# Patient Record
Sex: MALE | Race: WHITE
[De-identification: names, ages, dates, MRNs, and addresses within clinical notes are randomized per-mention and may not be internally consistent; named-entity substitution may affect disease eponyms.]

---

## 2021-05-24 ENCOUNTER — HOSPITAL ENCOUNTER (INPATIENT)
Dept: HOSPITAL 8 - ED | Age: 66
LOS: 1 days | Discharge: LEFT BEFORE DISCHARGE | DRG: 303 | End: 2021-05-25
Attending: INTERNAL MEDICINE | Admitting: HOSPITALIST
Payer: SELF-PAY

## 2021-05-24 VITALS — HEIGHT: 66 IN | WEIGHT: 215.61 LBS | BODY MASS INDEX: 34.65 KG/M2

## 2021-05-24 VITALS — SYSTOLIC BLOOD PRESSURE: 196 MMHG | DIASTOLIC BLOOD PRESSURE: 68 MMHG

## 2021-05-24 DIAGNOSIS — E78.5: ICD-10-CM

## 2021-05-24 DIAGNOSIS — I25.10: Primary | ICD-10-CM

## 2021-05-24 DIAGNOSIS — Z91.041: ICD-10-CM

## 2021-05-24 DIAGNOSIS — Z53.29: ICD-10-CM

## 2021-05-24 DIAGNOSIS — E66.9: ICD-10-CM

## 2021-05-24 DIAGNOSIS — Z95.5: ICD-10-CM

## 2021-05-24 DIAGNOSIS — I11.9: ICD-10-CM

## 2021-05-24 DIAGNOSIS — Z91.19: ICD-10-CM

## 2021-05-24 DIAGNOSIS — F40.240: ICD-10-CM

## 2021-05-24 DIAGNOSIS — F17.210: ICD-10-CM

## 2021-05-24 DIAGNOSIS — I25.2: ICD-10-CM

## 2021-05-24 LAB
ALBUMIN SERPL-MCNC: 4 G/DL (ref 3.4–5)
ANION GAP SERPL CALC-SCNC: 6 MMOL/L (ref 5–15)
BASOPHILS # BLD AUTO: 0.1 X10^3/UL (ref 0–0.1)
BASOPHILS NFR BLD AUTO: 1 % (ref 0–1)
CALCIUM SERPL-MCNC: 8.7 MG/DL (ref 8.5–10.1)
CHLORIDE SERPL-SCNC: 108 MMOL/L (ref 98–107)
CREAT SERPL-MCNC: 0.71 MG/DL (ref 0.7–1.3)
EOSINOPHIL # BLD AUTO: 0.3 X10^3/UL (ref 0–0.4)
EOSINOPHIL NFR BLD AUTO: 3 % (ref 1–7)
ERYTHROCYTE [DISTWIDTH] IN BLOOD BY AUTOMATED COUNT: 12.5 % (ref 9.4–14.8)
LYMPHOCYTES # BLD AUTO: 1.8 X10^3/UL (ref 1–3.4)
LYMPHOCYTES NFR BLD AUTO: 17 % (ref 22–44)
MCH RBC QN AUTO: 33.6 PG (ref 27.5–34.5)
MCHC RBC AUTO-ENTMCNC: 35.1 G/DL (ref 33.2–36.2)
MD: NO
MONOCYTES # BLD AUTO: 1 X10^3/UL (ref 0.2–0.8)
MONOCYTES NFR BLD AUTO: 9 % (ref 2–9)
NEUTROPHILS # BLD AUTO: 7.6 X10^3/UL (ref 1.8–6.8)
NEUTROPHILS NFR BLD AUTO: 71 % (ref 42–75)
PLATELET # BLD AUTO: 269 X10^3/UL (ref 130–400)
PMV BLD AUTO: 7.7 FL (ref 7.4–10.4)
RBC # BLD AUTO: 4.71 X10^6/UL (ref 4.38–5.82)
TROPONIN I SERPL-MCNC: < 0.015 NG/ML (ref 0–0.04)

## 2021-05-24 PROCEDURE — 96374 THER/PROPH/DIAG INJ IV PUSH: CPT

## 2021-05-24 PROCEDURE — 99285 EMERGENCY DEPT VISIT HI MDM: CPT

## 2021-05-24 PROCEDURE — 84484 ASSAY OF TROPONIN QUANT: CPT

## 2021-05-24 PROCEDURE — 83735 ASSAY OF MAGNESIUM: CPT

## 2021-05-24 PROCEDURE — 93306 TTE W/DOPPLER COMPLETE: CPT

## 2021-05-24 PROCEDURE — 71045 X-RAY EXAM CHEST 1 VIEW: CPT

## 2021-05-24 PROCEDURE — 83036 HEMOGLOBIN GLYCOSYLATED A1C: CPT

## 2021-05-24 PROCEDURE — 80048 BASIC METABOLIC PNL TOTAL CA: CPT

## 2021-05-24 PROCEDURE — 36415 COLL VENOUS BLD VENIPUNCTURE: CPT

## 2021-05-24 PROCEDURE — 84443 ASSAY THYROID STIM HORMONE: CPT

## 2021-05-24 PROCEDURE — 82040 ASSAY OF SERUM ALBUMIN: CPT

## 2021-05-24 PROCEDURE — 84100 ASSAY OF PHOSPHORUS: CPT

## 2021-05-24 PROCEDURE — 83880 ASSAY OF NATRIURETIC PEPTIDE: CPT

## 2021-05-24 PROCEDURE — 85025 COMPLETE CBC W/AUTO DIFF WBC: CPT

## 2021-05-24 PROCEDURE — 80061 LIPID PANEL: CPT

## 2021-05-24 PROCEDURE — 93005 ELECTROCARDIOGRAM TRACING: CPT

## 2021-05-24 PROCEDURE — 93356 MYOCRD STRAIN IMG SPCKL TRCK: CPT

## 2021-05-24 PROCEDURE — 93017 CV STRESS TEST TRACING ONLY: CPT

## 2021-05-24 NOTE — NUR
ASSUMED CARE OF PATIENT. PATIENT REPORTS CENTER CHEST PAIN 8/10 X2 DAYS. HX OF 
MI'S AND SENTS. CARDIAC MONITOR ON NSR NOTED. FRIEND AT BEDSIDE. CALL LIGHT IN 
PLACE.

## 2021-05-25 VITALS — DIASTOLIC BLOOD PRESSURE: 89 MMHG | SYSTOLIC BLOOD PRESSURE: 176 MMHG

## 2021-05-25 VITALS — DIASTOLIC BLOOD PRESSURE: 78 MMHG | SYSTOLIC BLOOD PRESSURE: 133 MMHG

## 2021-05-25 VITALS — DIASTOLIC BLOOD PRESSURE: 64 MMHG | SYSTOLIC BLOOD PRESSURE: 151 MMHG

## 2021-05-25 LAB
ANION GAP SERPL CALC-SCNC: 7 MMOL/L (ref 5–15)
BASOPHILS # BLD AUTO: 0.1 X10^3/UL (ref 0–0.1)
BASOPHILS NFR BLD AUTO: 1 % (ref 0–1)
CALCIUM SERPL-MCNC: 8.8 MG/DL (ref 8.5–10.1)
CHLORIDE SERPL-SCNC: 106 MMOL/L (ref 98–107)
CHOL/HDL RATIO: 6
CREAT SERPL-MCNC: 0.92 MG/DL (ref 0.7–1.3)
EOSINOPHIL # BLD AUTO: 0.4 X10^3/UL (ref 0–0.4)
EOSINOPHIL NFR BLD AUTO: 4 % (ref 1–7)
ERYTHROCYTE [DISTWIDTH] IN BLOOD BY AUTOMATED COUNT: 12.2 % (ref 9.4–14.8)
EST. AVERAGE GLUCOSE BLD GHB EST-MCNC: 134 MG/DL (ref 0–126)
HDL CHOL %: 17 % (ref 26–37)
HDL CHOLESTEROL (DIRECT): 30 MG/DL (ref 40–60)
LDL CHOLESTEROL,CALCULATED: 97 MG/DL (ref 54–169)
LDLC/HDLC SERPL: 3.2 {RATIO} (ref 0.5–3)
LYMPHOCYTES # BLD AUTO: 1.9 X10^3/UL (ref 1–3.4)
LYMPHOCYTES NFR BLD AUTO: 19 % (ref 22–44)
MCH RBC QN AUTO: 33.4 PG (ref 27.5–34.5)
MCHC RBC AUTO-ENTMCNC: 34.6 G/DL (ref 33.2–36.2)
MD: NO
MONOCYTES # BLD AUTO: 0.9 X10^3/UL (ref 0.2–0.8)
MONOCYTES NFR BLD AUTO: 9 % (ref 2–9)
NEUTROPHILS # BLD AUTO: 6.8 X10^3/UL (ref 1.8–6.8)
NEUTROPHILS NFR BLD AUTO: 67 % (ref 42–75)
PLATELET # BLD AUTO: 265 X10^3/UL (ref 130–400)
PMV BLD AUTO: 7.8 FL (ref 7.4–10.4)
RBC # BLD AUTO: 4.65 X10^6/UL (ref 4.38–5.82)
TRIGL SERPL-MCNC: 261 MG/DL (ref 50–200)
TROPONIN I SERPL-MCNC: < 0.015 NG/ML (ref 0–0.04)
TROPONIN I SERPL-MCNC: < 0.015 NG/ML (ref 0–0.04)
VLDLC SERPL CALC-MCNC: 52 MG/DL (ref 0–25)

## 2021-05-25 RX ADMIN — ONDANSETRON PRN MG: 2 INJECTION, SOLUTION INTRAMUSCULAR; INTRAVENOUS at 06:20

## 2021-05-25 RX ADMIN — HEPARIN SODIUM SCH UNITS: 5000 INJECTION, SOLUTION INTRAVENOUS; SUBCUTANEOUS at 00:00

## 2021-05-25 RX ADMIN — MORPHINE SULFATE PRN MG: 10 INJECTION INTRAVENOUS at 06:20

## 2021-05-25 RX ADMIN — HEPARIN SODIUM SCH UNITS: 5000 INJECTION, SOLUTION INTRAVENOUS; SUBCUTANEOUS at 07:35

## 2021-05-25 RX ADMIN — MORPHINE SULFATE PRN MG: 10 INJECTION INTRAVENOUS at 00:18

## 2021-05-25 RX ADMIN — ONDANSETRON PRN MG: 2 INJECTION, SOLUTION INTRAMUSCULAR; INTRAVENOUS at 11:45

## 2021-05-25 RX ADMIN — MORPHINE SULFATE PRN MG: 10 INJECTION INTRAVENOUS at 11:46

## 2023-03-31 ENCOUNTER — APPOINTMENT (OUTPATIENT)
Dept: CT IMAGING | Facility: HOSPITAL | Age: 68
End: 2023-03-31
Attending: EMERGENCY MEDICINE
Payer: MEDICARE

## 2023-03-31 ENCOUNTER — HOSPITAL ENCOUNTER (OUTPATIENT)
Facility: HOSPITAL | Age: 68
Setting detail: OBSERVATION
Discharge: LEFT BEFORE DISCHARGE | End: 2023-04-04
Attending: EMERGENCY MEDICINE | Admitting: HOSPITALIST
Payer: MEDICARE

## 2023-03-31 ENCOUNTER — APPOINTMENT (OUTPATIENT)
Dept: GENERAL RADIOLOGY | Facility: HOSPITAL | Age: 68
End: 2023-03-31
Attending: EMERGENCY MEDICINE
Payer: MEDICARE

## 2023-03-31 DIAGNOSIS — I25.10 CORONARY ARTERY DISEASE INVOLVING NATIVE CORONARY ARTERY OF NATIVE HEART, UNSPECIFIED WHETHER ANGINA PRESENT: ICD-10-CM

## 2023-03-31 DIAGNOSIS — E27.8 ADRENAL NODULE (HCC): ICD-10-CM

## 2023-03-31 DIAGNOSIS — R07.9 ACUTE CHEST PAIN: Primary | ICD-10-CM

## 2023-03-31 LAB
ALBUMIN SERPL-MCNC: 4.2 G/DL (ref 3.4–5)
ALBUMIN/GLOB SERPL: 1.1 {RATIO} (ref 1–2)
ALP LIVER SERPL-CCNC: 94 U/L
ALT SERPL-CCNC: 34 U/L
ANION GAP SERPL CALC-SCNC: 7 MMOL/L (ref 0–18)
AST SERPL-CCNC: 20 U/L (ref 15–37)
BASOPHILS # BLD AUTO: 0.04 X10(3) UL (ref 0–0.2)
BASOPHILS NFR BLD AUTO: 0.4 %
BILIRUB SERPL-MCNC: 0.8 MG/DL (ref 0.1–2)
BILIRUB UR QL STRIP.AUTO: NEGATIVE
BUN BLD-MCNC: 22 MG/DL (ref 7–18)
CALCIUM BLD-MCNC: 9.7 MG/DL (ref 8.5–10.1)
CHLORIDE SERPL-SCNC: 108 MMOL/L (ref 98–112)
CLARITY UR REFRACT.AUTO: CLEAR
CO2 SERPL-SCNC: 23 MMOL/L (ref 21–32)
CREAT BLD-MCNC: 1.04 MG/DL
D DIMER PPP FEU-MCNC: <0.27 UG/ML FEU (ref ?–0.66)
EOSINOPHIL # BLD AUTO: 0.1 X10(3) UL (ref 0–0.7)
EOSINOPHIL NFR BLD AUTO: 0.9 %
ERYTHROCYTE [DISTWIDTH] IN BLOOD BY AUTOMATED COUNT: 12.8 %
EST. AVERAGE GLUCOSE BLD GHB EST-MCNC: 108 MG/DL (ref 68–126)
GFR SERPLBLD BASED ON 1.73 SQ M-ARVRAT: 79 ML/MIN/1.73M2 (ref 60–?)
GLOBULIN PLAS-MCNC: 3.7 G/DL (ref 2.8–4.4)
GLUCOSE BLD-MCNC: 106 MG/DL (ref 70–99)
GLUCOSE BLD-MCNC: 194 MG/DL (ref 70–99)
GLUCOSE UR STRIP.AUTO-MCNC: NEGATIVE MG/DL
HBA1C MFR BLD: 5.4 % (ref ?–5.7)
HCT VFR BLD AUTO: 38.9 %
HGB BLD-MCNC: 12.6 G/DL
IMM GRANULOCYTES # BLD AUTO: 0.08 X10(3) UL (ref 0–1)
IMM GRANULOCYTES NFR BLD: 0.8 %
LEUKOCYTE ESTERASE UR QL STRIP.AUTO: NEGATIVE
LYMPHOCYTES # BLD AUTO: 0.74 X10(3) UL (ref 1–4)
LYMPHOCYTES NFR BLD AUTO: 7 %
MCH RBC QN AUTO: 31.3 PG (ref 26–34)
MCHC RBC AUTO-ENTMCNC: 32.4 G/DL (ref 31–37)
MCV RBC AUTO: 96.8 FL
MONOCYTES # BLD AUTO: 0.69 X10(3) UL (ref 0.1–1)
MONOCYTES NFR BLD AUTO: 6.5 %
NEUTROPHILS # BLD AUTO: 8.9 X10 (3) UL (ref 1.5–7.7)
NEUTROPHILS # BLD AUTO: 8.9 X10(3) UL (ref 1.5–7.7)
NEUTROPHILS NFR BLD AUTO: 84.4 %
NITRITE UR QL STRIP.AUTO: NEGATIVE
OSMOLALITY SERPL CALC.SUM OF ELEC: 290 MOSM/KG (ref 275–295)
PH UR STRIP.AUTO: 7 [PH] (ref 5–8)
PLATELET # BLD AUTO: 247 10(3)UL (ref 150–450)
POTASSIUM SERPL-SCNC: 4.2 MMOL/L (ref 3.5–5.1)
PROT SERPL-MCNC: 7.9 G/DL (ref 6.4–8.2)
PROT UR STRIP.AUTO-MCNC: NEGATIVE MG/DL
RBC # BLD AUTO: 4.02 X10(6)UL
RBC UR QL AUTO: NEGATIVE
SARS-COV-2 RNA RESP QL NAA+PROBE: NOT DETECTED
SODIUM SERPL-SCNC: 138 MMOL/L (ref 136–145)
SP GR UR STRIP.AUTO: >1.03 (ref 1–1.03)
TROPONIN I HIGH SENSITIVITY: 23 NG/L
TROPONIN I HIGH SENSITIVITY: 50 NG/L
TROPONIN I HIGH SENSITIVITY: 53 NG/L
UROBILINOGEN UR STRIP.AUTO-MCNC: <2 MG/DL
WBC # BLD AUTO: 10.6 X10(3) UL (ref 4–11)

## 2023-03-31 PROCEDURE — 71045 X-RAY EXAM CHEST 1 VIEW: CPT | Performed by: EMERGENCY MEDICINE

## 2023-03-31 PROCEDURE — 99223 1ST HOSP IP/OBS HIGH 75: CPT | Performed by: HOSPITALIST

## 2023-03-31 PROCEDURE — 71275 CT ANGIOGRAPHY CHEST: CPT | Performed by: EMERGENCY MEDICINE

## 2023-03-31 RX ORDER — EMPAGLIFLOZIN AND METFORMIN HYDROCHLORIDE 12.5; 1 MG/1; MG/1
1 TABLET ORAL 2 TIMES DAILY
COMMUNITY

## 2023-03-31 RX ORDER — ATORVASTATIN CALCIUM 40 MG/1
40 TABLET, FILM COATED ORAL NIGHTLY
COMMUNITY

## 2023-03-31 RX ORDER — ASPIRIN 325 MG
325 TABLET ORAL DAILY
COMMUNITY
End: 2023-04-04

## 2023-03-31 RX ORDER — NICOTINE POLACRILEX 4 MG
15 LOZENGE BUCCAL
Status: DISCONTINUED | OUTPATIENT
Start: 2023-03-31 | End: 2023-04-04

## 2023-03-31 RX ORDER — BISACODYL 10 MG
10 SUPPOSITORY, RECTAL RECTAL
Status: DISCONTINUED | OUTPATIENT
Start: 2023-03-31 | End: 2023-04-04

## 2023-03-31 RX ORDER — ECHINACEA PURPUREA EXTRACT 125 MG
1 TABLET ORAL
Status: DISCONTINUED | OUTPATIENT
Start: 2023-03-31 | End: 2023-04-04

## 2023-03-31 RX ORDER — MELATONIN
3 NIGHTLY PRN
Status: DISCONTINUED | OUTPATIENT
Start: 2023-03-31 | End: 2023-04-04

## 2023-03-31 RX ORDER — POLYETHYLENE GLYCOL 3350 17 G/17G
17 POWDER, FOR SOLUTION ORAL DAILY PRN
Status: DISCONTINUED | OUTPATIENT
Start: 2023-03-31 | End: 2023-04-04

## 2023-03-31 RX ORDER — SENNOSIDES 8.6 MG
17.2 TABLET ORAL NIGHTLY PRN
Status: DISCONTINUED | OUTPATIENT
Start: 2023-03-31 | End: 2023-04-04

## 2023-03-31 RX ORDER — ACETAMINOPHEN 500 MG
1000 TABLET ORAL EVERY 4 HOURS PRN
Status: DISCONTINUED | OUTPATIENT
Start: 2023-03-31 | End: 2023-04-04

## 2023-03-31 RX ORDER — OMEPRAZOLE 40 MG/1
40 CAPSULE, DELAYED RELEASE ORAL DAILY
COMMUNITY

## 2023-03-31 RX ORDER — LISINOPRIL 20 MG/1
20 TABLET ORAL DAILY
Status: DISCONTINUED | OUTPATIENT
Start: 2023-04-01 | End: 2023-04-04

## 2023-03-31 RX ORDER — ATORVASTATIN CALCIUM 40 MG/1
40 TABLET, FILM COATED ORAL NIGHTLY
Status: DISCONTINUED | OUTPATIENT
Start: 2023-04-01 | End: 2023-04-04

## 2023-03-31 RX ORDER — CLOPIDOGREL BISULFATE 75 MG/1
75 TABLET ORAL DAILY
Status: DISCONTINUED | OUTPATIENT
Start: 2023-04-01 | End: 2023-04-04

## 2023-03-31 RX ORDER — LISINOPRIL 20 MG/1
20 TABLET ORAL DAILY
COMMUNITY

## 2023-03-31 RX ORDER — NICOTINE POLACRILEX 4 MG
30 LOZENGE BUCCAL
Status: DISCONTINUED | OUTPATIENT
Start: 2023-03-31 | End: 2023-04-04

## 2023-03-31 RX ORDER — CLOPIDOGREL BISULFATE 75 MG/1
75 TABLET ORAL DAILY
COMMUNITY

## 2023-03-31 RX ORDER — DEXTROSE MONOHYDRATE 25 G/50ML
50 INJECTION, SOLUTION INTRAVENOUS
Status: DISCONTINUED | OUTPATIENT
Start: 2023-03-31 | End: 2023-04-04

## 2023-03-31 RX ORDER — ONDANSETRON 2 MG/ML
4 INJECTION INTRAMUSCULAR; INTRAVENOUS EVERY 6 HOURS PRN
Status: DISCONTINUED | OUTPATIENT
Start: 2023-03-31 | End: 2023-04-04

## 2023-03-31 RX ORDER — NITROGLYCERIN 0.4 MG/1
0.4 TABLET SUBLINGUAL EVERY 5 MIN PRN
COMMUNITY

## 2023-03-31 RX ORDER — SODIUM PHOSPHATE, DIBASIC AND SODIUM PHOSPHATE, MONOBASIC 7; 19 G/133ML; G/133ML
1 ENEMA RECTAL ONCE AS NEEDED
Status: DISCONTINUED | OUTPATIENT
Start: 2023-03-31 | End: 2023-04-04

## 2023-03-31 RX ORDER — HYDROXYZINE HYDROCHLORIDE 25 MG/1
25 TABLET, FILM COATED ORAL 3 TIMES DAILY PRN
COMMUNITY

## 2023-03-31 RX ORDER — PANTOPRAZOLE SODIUM 40 MG/1
40 TABLET, DELAYED RELEASE ORAL
Status: DISCONTINUED | OUTPATIENT
Start: 2023-04-01 | End: 2023-04-04

## 2023-03-31 RX ORDER — NITROGLYCERIN 0.4 MG/1
0.4 TABLET SUBLINGUAL EVERY 5 MIN PRN
Status: DISCONTINUED | OUTPATIENT
Start: 2023-03-31 | End: 2023-04-04

## 2023-03-31 RX ORDER — LORAZEPAM 2 MG/ML
0.5 INJECTION INTRAMUSCULAR ONCE
Status: COMPLETED | OUTPATIENT
Start: 2023-03-31 | End: 2023-03-31

## 2023-03-31 RX ORDER — METOPROLOL SUCCINATE 25 MG/1
25 TABLET, EXTENDED RELEASE ORAL
Status: DISCONTINUED | OUTPATIENT
Start: 2023-04-01 | End: 2023-04-04

## 2023-03-31 RX ORDER — ONDANSETRON 2 MG/ML
INJECTION INTRAMUSCULAR; INTRAVENOUS
Status: COMPLETED
Start: 2023-03-31 | End: 2023-03-31

## 2023-03-31 RX ORDER — METOCLOPRAMIDE HYDROCHLORIDE 5 MG/ML
10 INJECTION INTRAMUSCULAR; INTRAVENOUS EVERY 8 HOURS PRN
Status: DISCONTINUED | OUTPATIENT
Start: 2023-03-31 | End: 2023-04-04

## 2023-03-31 RX ORDER — METOPROLOL SUCCINATE 25 MG/1
25 TABLET, EXTENDED RELEASE ORAL DAILY
COMMUNITY

## 2023-03-31 RX ORDER — DULAGLUTIDE 0.75 MG/.5ML
INJECTION, SOLUTION SUBCUTANEOUS
COMMUNITY

## 2023-03-31 RX ORDER — FUROSEMIDE 20 MG/1
20 TABLET ORAL 2 TIMES DAILY
COMMUNITY

## 2023-03-31 RX ORDER — NITROGLYCERIN 0.4 MG/1
0.4 TABLET SUBLINGUAL EVERY 5 MIN PRN
Status: DISCONTINUED | OUTPATIENT
Start: 2023-03-31 | End: 2023-03-31

## 2023-03-31 RX ORDER — ASPIRIN 81 MG/1
81 TABLET ORAL DAILY
Status: DISCONTINUED | OUTPATIENT
Start: 2023-03-31 | End: 2023-04-04

## 2023-03-31 RX ORDER — ONDANSETRON 2 MG/ML
4 INJECTION INTRAMUSCULAR; INTRAVENOUS ONCE
Status: COMPLETED | OUTPATIENT
Start: 2023-03-31 | End: 2023-03-31

## 2023-04-01 LAB
BASOPHILS # BLD AUTO: 0.04 X10(3) UL (ref 0–0.2)
BASOPHILS NFR BLD AUTO: 0.6 %
EOSINOPHIL # BLD AUTO: 0.29 X10(3) UL (ref 0–0.7)
EOSINOPHIL NFR BLD AUTO: 4.2 %
ERYTHROCYTE [DISTWIDTH] IN BLOOD BY AUTOMATED COUNT: 12.7 %
GLUCOSE BLD-MCNC: 108 MG/DL (ref 70–99)
GLUCOSE BLD-MCNC: 114 MG/DL (ref 70–99)
GLUCOSE BLD-MCNC: 137 MG/DL (ref 70–99)
GLUCOSE BLD-MCNC: 146 MG/DL (ref 70–99)
GLUCOSE BLD-MCNC: 166 MG/DL (ref 70–99)
HCT VFR BLD AUTO: 35.7 %
HGB BLD-MCNC: 11.4 G/DL
IMM GRANULOCYTES # BLD AUTO: 0.03 X10(3) UL (ref 0–1)
IMM GRANULOCYTES NFR BLD: 0.4 %
LYMPHOCYTES # BLD AUTO: 1.33 X10(3) UL (ref 1–4)
LYMPHOCYTES NFR BLD AUTO: 19.3 %
MCH RBC QN AUTO: 31 PG (ref 26–34)
MCHC RBC AUTO-ENTMCNC: 31.9 G/DL (ref 31–37)
MCV RBC AUTO: 97 FL
MONOCYTES # BLD AUTO: 0.76 X10(3) UL (ref 0.1–1)
MONOCYTES NFR BLD AUTO: 11 %
NEUTROPHILS # BLD AUTO: 4.43 X10 (3) UL (ref 1.5–7.7)
NEUTROPHILS # BLD AUTO: 4.43 X10(3) UL (ref 1.5–7.7)
NEUTROPHILS NFR BLD AUTO: 64.5 %
PLATELET # BLD AUTO: 241 10(3)UL (ref 150–450)
RBC # BLD AUTO: 3.68 X10(6)UL
TROPONIN I HIGH SENSITIVITY: 32 NG/L
WBC # BLD AUTO: 6.9 X10(3) UL (ref 4–11)

## 2023-04-01 PROCEDURE — 99233 SBSQ HOSP IP/OBS HIGH 50: CPT | Performed by: HOSPITALIST

## 2023-04-01 NOTE — PLAN OF CARE
A/o x4. RA. VSS. Up ad dl. Continent and voiding. NSR on tele. Bed in lowest position, call light in reach, updated on plan of care, all questions answered at this time.     Problem: Diabetes/Glucose Control  Goal: Glucose maintained within prescribed range  Description: INTERVENTIONS:  - Monitor Blood Glucose as ordered  - Assess for signs and symptoms of hyperglycemia and hypoglycemia  - Administer ordered medications to maintain glucose within target range  - Assess barriers to adequate nutritional intake and initiate nutrition consult as needed  - Instruct patient on self management of diabetes  Outcome: Progressing

## 2023-04-01 NOTE — CM/SW NOTE
04/01/23 1500   CM/SW Referral Data   Referral Source Social Work (self-referral)   Reason for Referral Discharge planning;Psychosocial assessment   Informant Patient   Medical Hx   Does patient have an established PCP? No   Patient Info   Patient's Current Mental Status at Time of Assessment Alert;Oriented   Patient's [address]   Patient lives with Spouse/Significant other   Patient Status Prior to Admission   Independent with ADLs and Mobility Yes         Received order for 'discharge planning.' Performed chart review prior to meeting with pt. Upon review, noted that pt has had multiple admissions from different hospitals across the country. Pt was just in [facility]. Pt states that he was going home from a work trip to his hometown [place]. Pt states that he was working in [place]. Met with pt at bedside. Pt presents as alert and oriented x4. Pt had his laptop, ipad, cell phone on in his room. Pt looked like he was working. Gathered history from pt. Pt confirms that he lives at home w/ his spouse, who spouse claims is a doctor. Pt states that he has 9 children, 6 daughters are nurses, 2 are police officers, and 1 is a . Pt states that he has a high stress job with [employer]. Pt states that he was taking [employer]/[place] home where the conductor on the train stopped in [facility] area after noticing that the pt did not look well. Pt is now at [facility]. SW questioned means of transport on returning back home at hospital discharge. Pt reports \"i'm not broke, I have this wallet filled with credit cards, and 4 debit cards. \" Pt also showed SW on his phone the [employer] schedule. Pt also discussed his frustration with [facility], states that he gets questioned about his whereabouts when he ends up at other hospitals. Pt got a second opinion from [facility] here, planning for a cath on Monday.  Pt heavily explained how the cardiologist at [place] [address] knows his cardiology group back home on the [facility]. Pt also stated that his spouse is driving to a [facility] today for about 200 miles so that she can fax paperwork from his hospital stay to the cardiologist to review. Upon leaving pt's room, pt states \"that he is not here for a free turkey sandwich if that's what the hospital thinks he's here for. \"    SW will continue to follow. Noted that psych has been consulted.     PROSPER Perez, [facility]  Discharge [address] [facility].

## 2023-04-01 NOTE — PROGRESS NOTES
NURSING ADMISSION NOTE      Patient admitted via Cart  Oriented to room. Safety precautions initiated. Bed in low position. Call light in reach. Admission navigator completed with patient. Alert and oriented x4. On room air with adequate O2 saturations. NSR/SB on tele. C/o chest pain and headache. Offered extra strength tylenol for headache and nitroglycerin for chest pain. Declining, stating \"don't worry about it, I am not going to beg for anything. \" Continent. Up ad dl with a steady gait. Needs met at this time. APN and MD aware of request for stronger pain medications. 0150: pt requested to see this writer. Well appearing, non-diaphoretic, awake on phone, stating \"I am still having this chest pain,\" this RN offered nitroglycerin and the extra strength tylenol as ordered, pt stating \"do you know how much nitroglycerin I have had tonight? They don't have anything else for me? \" Educated pt that per cardiology, they are not ordering any other pain medications, he then stated Leslie Lilly are you guys being so mean to me? Will cardiology be seeing me in the morning? \" Also wondering who the group to be seeing him was. Answered patient's questions.        Plan:  EKG  Trend troponins  Nitroglycerin and tylenol for pain

## 2023-04-02 LAB
ATRIAL RATE: 85 BPM
GLUCOSE BLD-MCNC: 120 MG/DL (ref 70–99)
GLUCOSE BLD-MCNC: 138 MG/DL (ref 70–99)
GLUCOSE BLD-MCNC: 148 MG/DL (ref 70–99)
GLUCOSE BLD-MCNC: 171 MG/DL (ref 70–99)
P AXIS: 49 DEGREES
P-R INTERVAL: 142 MS
Q-T INTERVAL: 364 MS
QRS DURATION: 94 MS
QTC CALCULATION (BEZET): 433 MS
R AXIS: -34 DEGREES
T AXIS: 35 DEGREES
VENTRICULAR RATE: 85 BPM

## 2023-04-02 PROCEDURE — 99233 SBSQ HOSP IP/OBS HIGH 50: CPT | Performed by: HOSPITALIST

## 2023-04-02 RX ORDER — MORPHINE SULFATE 4 MG/ML
4 INJECTION, SOLUTION INTRAMUSCULAR; INTRAVENOUS EVERY 2 HOUR PRN
Status: DISCONTINUED | OUTPATIENT
Start: 2023-04-02 | End: 2023-04-03

## 2023-04-02 RX ORDER — SODIUM CHLORIDE 9 MG/ML
INJECTION, SOLUTION INTRAVENOUS CONTINUOUS
Status: DISCONTINUED | OUTPATIENT
Start: 2023-04-02 | End: 2023-04-04

## 2023-04-02 RX ORDER — ASPIRIN 81 MG/1
324 TABLET, CHEWABLE ORAL ONCE
Status: COMPLETED | OUTPATIENT
Start: 2023-04-02 | End: 2023-04-03

## 2023-04-02 RX ORDER — MORPHINE SULFATE 2 MG/ML
1 INJECTION, SOLUTION INTRAMUSCULAR; INTRAVENOUS EVERY 2 HOUR PRN
Status: DISCONTINUED | OUTPATIENT
Start: 2023-04-02 | End: 2023-04-03

## 2023-04-02 RX ORDER — MORPHINE SULFATE 2 MG/ML
2 INJECTION, SOLUTION INTRAMUSCULAR; INTRAVENOUS EVERY 2 HOUR PRN
Status: DISCONTINUED | OUTPATIENT
Start: 2023-04-02 | End: 2023-04-03

## 2023-04-02 NOTE — PROGRESS NOTES
1200: Seemingly out of the blue, Pt stating he will \"chino the hospital\" if he does not get a new hospitalist. Also states, \"I will go to the supervisor if she comes in my room again\". Pt will not specify what has upset him about current MD. This Manolo Choudhary paged hospitalist to make aware, also informed charge RN.

## 2023-04-02 NOTE — PLAN OF CARE
Pt a/o x4. RA. VSS. Up ad dl. NSR on tele. Continent and voiding. No complaints of pain. Free of cardiac symptoms all day. Consent for angiogram placed in chart. Updated on plan of care, bed in lowest position, all questions answered.   Problem: Diabetes/Glucose Control  Goal: Glucose maintained within prescribed range  Description: INTERVENTIONS:  - Monitor Blood Glucose as ordered  - Assess for signs and symptoms of hyperglycemia and hypoglycemia  - Administer ordered medications to maintain glucose within target range  - Assess barriers to adequate nutritional intake and initiate nutrition consult as needed  - Instruct patient on self management of diabetes  Outcome: Progressing     Problem: Patient/Family Goals  Goal: Patient/Family Long Term Goal  Description: Patient's Long Term Goal: \" to go home\"    Interventions:  - cath monday  - See additional Care Plan goals for specific interventions  Outcome: Progressing  Goal: Patient/Family Short Term Goal  Description: Patient's Short Term Goal: feel better    Interventions:   - rest  - cath monday  - See additional Care Plan goals for specific interventions  Outcome: Progressing

## 2023-04-02 NOTE — PLAN OF CARE
Assumed care at [address] [facility]. Pt is A&Ox4. Pt is on RA, sats maintaining >90%. NSR on tele, VSS. No complaints of cardiac symptoms. Continent of B&B. Denies pain at this time. Up independently, tolerating well. Plan of care reviewed with patient, verbalizes understanding, all needs addressed at this time, pt seems to be resting comfortably.      Problem: Diabetes/Glucose Control  Goal: Glucose maintained within prescribed range  Description: INTERVENTIONS:  - Monitor Blood Glucose as ordered  - Assess for signs and symptoms of hyperglycemia and hypoglycemia  - Administer ordered medications to maintain glucose within target range  - Assess barriers to adequate nutritional intake and initiate nutrition consult as needed  - Instruct patient on self management of diabetes  4/1/2023 2129 by Migel Samson RN  Outcome: Progressing  4/1/2023 2128 by Migel Samson RN  Outcome: Progressing     Problem: Patient/Family Goals  Goal: Patient/Family Long Term Goal  Description: Patient's Long Term Goal: \" to go home\"    Interventions:  - cath monday  - See additional Care Plan goals for specific interventions  4/1/2023 2129 by Migel Samson RN  Outcome: Progressing  4/1/2023 2128 by Migel Samson RN  Outcome: Progressing  Goal: Patient/Family Short Term Goal  Description: Patient's Short Term Goal: feel better    Interventions:   - rest  - cath monday  - See additional Care Plan goals for specific interventions  4/1/2023 2129 by Migel Samson RN  Outcome: Progressing  4/1/2023 2128 by Migel Samson RN  Outcome: Progressing

## 2023-04-03 LAB
ANION GAP SERPL CALC-SCNC: 3 MMOL/L (ref 0–18)
APTT PPP: 30.7 SECONDS (ref 23.3–35.6)
ATRIAL RATE: 56 BPM
BUN BLD-MCNC: 26 MG/DL (ref 7–18)
CALCIUM BLD-MCNC: 8.9 MG/DL (ref 8.5–10.1)
CHLORIDE SERPL-SCNC: 111 MMOL/L (ref 98–112)
CO2 SERPL-SCNC: 22 MMOL/L (ref 21–32)
CREAT BLD-MCNC: 0.95 MG/DL
ERYTHROCYTE [DISTWIDTH] IN BLOOD BY AUTOMATED COUNT: 12.4 %
GFR SERPLBLD BASED ON 1.73 SQ M-ARVRAT: 88 ML/MIN/1.73M2 (ref 60–?)
GLUCOSE BLD-MCNC: 118 MG/DL (ref 70–99)
GLUCOSE BLD-MCNC: 137 MG/DL (ref 70–99)
GLUCOSE BLD-MCNC: 137 MG/DL (ref 70–99)
GLUCOSE BLD-MCNC: 138 MG/DL (ref 70–99)
GLUCOSE BLD-MCNC: 186 MG/DL (ref 70–99)
GLUCOSE BLD-MCNC: 226 MG/DL (ref 70–99)
HCT VFR BLD AUTO: 38.2 %
HGB BLD-MCNC: 12.4 G/DL
INR BLD: 1.1 (ref 0.85–1.16)
MAGNESIUM SERPL-MCNC: 2.3 MG/DL (ref 1.6–2.6)
MCH RBC QN AUTO: 31.5 PG (ref 26–34)
MCHC RBC AUTO-ENTMCNC: 32.5 G/DL (ref 31–37)
MCV RBC AUTO: 97 FL
OSMOLALITY SERPL CALC.SUM OF ELEC: 289 MOSM/KG (ref 275–295)
P AXIS: 57 DEGREES
P-R INTERVAL: 156 MS
PLATELET # BLD AUTO: 252 10(3)UL (ref 150–450)
POTASSIUM SERPL-SCNC: 4.2 MMOL/L (ref 3.5–5.1)
PROTHROMBIN TIME: 14.2 SECONDS (ref 11.6–14.8)
Q-T INTERVAL: 438 MS
QRS DURATION: 96 MS
QTC CALCULATION (BEZET): 422 MS
R AXIS: -23 DEGREES
RBC # BLD AUTO: 3.94 X10(6)UL
SODIUM SERPL-SCNC: 136 MMOL/L (ref 136–145)
T AXIS: 14 DEGREES
VENTRICULAR RATE: 56 BPM
WBC # BLD AUTO: 7.6 X10(3) UL (ref 4–11)

## 2023-04-03 PROCEDURE — 99232 SBSQ HOSP IP/OBS MODERATE 35: CPT | Performed by: HOSPITALIST

## 2023-04-03 RX ORDER — KETOROLAC TROMETHAMINE 30 MG/ML
30 INJECTION, SOLUTION INTRAMUSCULAR; INTRAVENOUS EVERY 6 HOURS PRN
Status: DISCONTINUED | OUTPATIENT
Start: 2023-04-03 | End: 2023-04-04

## 2023-04-03 RX ORDER — KETOROLAC TROMETHAMINE 30 MG/ML
15 INJECTION, SOLUTION INTRAMUSCULAR; INTRAVENOUS EVERY 6 HOURS PRN
Status: DISCONTINUED | OUTPATIENT
Start: 2023-04-03 | End: 2023-04-04

## 2023-04-03 NOTE — PLAN OF CARE
Pt  received at bedside. A&O X4. NSR on tele. Continent bowel & bladder, last BM 4/2, Up ad dl. No complaints of pain, SOB or CP. Plan of care discussed w/t patient. Call light with in reach. Fall precaution in place. All questions answered     Pt expected to go to cath lab this morning between [id number]. Declines to go to cath lab until wife comes around 1200. Cath lab notified.

## 2023-04-03 NOTE — PLAN OF CARE
Pt c/o chest pain 6/10- vital signs stable- nitro /tylenol offered to pt but he refused- stated that med has not been working for him-don't know why the Dr have not been giving him something else for pain- Dr Bryan Castaneda notified and order for morphine received- however on further review of pt's chart there has been concerns of him receiving narcotics- MD notified - morphine d/thu -order for toradol received and given  Will con't to monitor  plan for LHC in am  Pt stable  Problem: Diabetes/Glucose Control  Goal: Glucose maintained within prescribed range  Description: INTERVENTIONS:  - Monitor Blood Glucose as ordered  - Assess for signs and symptoms of hyperglycemia and hypoglycemia  - Administer ordered medications to maintain glucose within target range  - Assess barriers to adequate nutritional intake and initiate nutrition consult as needed  - Instruct patient on self management of diabetes  Outcome: Progressing     Problem: CARDIOVASCULAR - ADULT  Goal: Maintains optimal cardiac output and hemodynamic stability  Description: INTERVENTIONS:  - Monitor vital signs, rhythm, and trends  - Monitor for bleeding, hypotension and signs of decreased cardiac output  - Evaluate effectiveness of vasoactive medications to optimize hemodynamic stability  - Monitor arterial and/or venous puncture sites for bleeding and/or hematoma  - Assess quality of pulses, skin color and temperature  - Assess for signs of decreased coronary artery perfusion - ex.  Angina  - Evaluate fluid balance, assess for edema, trend weights  Outcome: Progressing  Goal: Absence of cardiac arrhythmias or at baseline  Description: INTERVENTIONS:  - Continuous cardiac monitoring, monitor vital signs, obtain 12 lead EKG if indicated  - Evaluate effectiveness of antiarrhythmic and heart rate control medications as ordered  - Initiate emergency measures for life threatening arrhythmias  - Monitor electrolytes and administer replacement therapy as ordered  Outcome: Progressing     Problem: METABOLIC/FLUID AND ELECTROLYTES - ADULT  Goal: Glucose maintained within prescribed range  Description: INTERVENTIONS:  - Monitor Blood Glucose as ordered  - Assess for signs and symptoms of hyperglycemia and hypoglycemia  - Administer ordered medications to maintain glucose within target range  - Assess barriers to adequate nutritional intake and initiate nutrition consult as needed  - Instruct patient on self management of diabetes  Outcome: Progressing

## 2023-04-04 ENCOUNTER — APPOINTMENT (OUTPATIENT)
Dept: INTERVENTIONAL RADIOLOGY/VASCULAR | Facility: HOSPITAL | Age: 68
End: 2023-04-04
Attending: PHYSICIAN ASSISTANT
Payer: MEDICARE

## 2023-04-04 VITALS
HEART RATE: 57 BPM | RESPIRATION RATE: 18 BRPM | HEIGHT: 66 IN | DIASTOLIC BLOOD PRESSURE: 67 MMHG | WEIGHT: 187.81 LBS | BODY MASS INDEX: 30.18 KG/M2 | SYSTOLIC BLOOD PRESSURE: 128 MMHG | OXYGEN SATURATION: 95 % | TEMPERATURE: 98 F

## 2023-04-04 RX ORDER — LIDOCAINE HYDROCHLORIDE 10 MG/ML
INJECTION, SOLUTION EPIDURAL; INFILTRATION; INTRACAUDAL; PERINEURAL
Status: COMPLETED
Start: 2023-04-04 | End: 2023-04-04

## 2023-04-04 RX ORDER — HEPARIN SODIUM 5000 [USP'U]/ML
INJECTION, SOLUTION INTRAVENOUS; SUBCUTANEOUS
Status: COMPLETED
Start: 2023-04-04 | End: 2023-04-04

## 2023-04-04 RX ORDER — NITROGLYCERIN 20 MG/100ML
INJECTION INTRAVENOUS
Status: COMPLETED
Start: 2023-04-04 | End: 2023-04-04

## 2023-04-04 RX ORDER — VERAPAMIL HYDROCHLORIDE 2.5 MG/ML
INJECTION, SOLUTION INTRAVENOUS
Status: COMPLETED
Start: 2023-04-04 | End: 2023-04-04

## 2023-04-04 NOTE — PROGRESS NOTES
Received pt at the bedside during handoff report and pt stated that he would like to leave against medical advice. He stated \" Take off my IV and box, I want to go. \" Pt signed the AMA form. Attending and duly cardiology NP made aware. Pt left unit with all belongings, assisted by PCT.

## 2023-04-04 NOTE — PLAN OF CARE
Received patient, alert and oriented. Denied chest pain, denied SOB. Up ad dl. Discussed POC. Due meds given. Reminded NPO P MN. Safety measures reinforced, call light within reach. Needs attended to. Will continue to monitor. Problem: Diabetes/Glucose Control  Goal: Glucose maintained within prescribed range  Description: INTERVENTIONS:  - Monitor Blood Glucose as ordered  - Assess for signs and symptoms of hyperglycemia and hypoglycemia  - Administer ordered medications to maintain glucose within target range  - Assess barriers to adequate nutritional intake and initiate nutrition consult as needed  - Instruct patient on self management of diabetes  Outcome: Progressing     Problem: Patient/Family Goals  Goal: Patient/Family Long Term Goal  Description: Patient's Long Term Goal: \" to go home\"    Interventions:  - cath monday  - See additional Care Plan goals for specific interventions  Outcome: Progressing  Goal: Patient/Family Short Term Goal  Description: Patient's Short Term Goal: feel better    Interventions:   - rest  - cath monday  - See additional Care Plan goals for specific interventions  Outcome: Progressing     Problem: CARDIOVASCULAR - ADULT  Goal: Maintains optimal cardiac output and hemodynamic stability  Description: INTERVENTIONS:  - Monitor vital signs, rhythm, and trends  - Monitor for bleeding, hypotension and signs of decreased cardiac output  - Evaluate effectiveness of vasoactive medications to optimize hemodynamic stability  - Monitor arterial and/or venous puncture sites for bleeding and/or hematoma  - Assess quality of pulses, skin color and temperature  - Assess for signs of decreased coronary artery perfusion - ex.  Angina  - Evaluate fluid balance, assess for edema, trend weights  Outcome: Progressing  Goal: Absence of cardiac arrhythmias or at baseline  Description: INTERVENTIONS:  - Continuous cardiac monitoring, monitor vital signs, obtain 12 lead EKG if indicated  - Evaluate effectiveness of antiarrhythmic and heart rate control medications as ordered  - Initiate emergency measures for life threatening arrhythmias  - Monitor electrolytes and administer replacement therapy as ordered  Outcome: Progressing     Problem: METABOLIC/FLUID AND ELECTROLYTES - ADULT  Goal: Glucose maintained within prescribed range  Description: INTERVENTIONS:  - Monitor Blood Glucose as ordered  - Assess for signs and symptoms of hyperglycemia and hypoglycemia  - Administer ordered medications to maintain glucose within target range  - Assess barriers to adequate nutritional intake and initiate nutrition consult as needed  - Instruct patient on self management of diabetes  Outcome: Progressing

## 2023-04-04 NOTE — PLAN OF CARE
This RN brought AMA papers at patients request. Pt educated on importance of staying in hospital and receiving angiogram. Pt reports that \"wife is having a hard time, Evelio Brooks has my tools, and I'm so far away from home\". Pt signed AMA papers, tele and IV removed, and assisted to [facility] entrance by PCT.

## 2024-06-18 ENCOUNTER — HOSPITAL ENCOUNTER (INPATIENT)
Facility: MEDICAL CENTER | Age: 69
LOS: 4 days | DRG: 392 | End: 2024-06-24
Attending: STUDENT IN AN ORGANIZED HEALTH CARE EDUCATION/TRAINING PROGRAM | Admitting: INTERNAL MEDICINE
Payer: MEDICARE

## 2024-06-18 DIAGNOSIS — R07.9 CHEST PAIN, UNSPECIFIED TYPE: ICD-10-CM

## 2024-06-18 DIAGNOSIS — Z86.79 HISTORY OF CORONARY ARTERY DISEASE: ICD-10-CM

## 2024-06-18 DIAGNOSIS — I10 PRIMARY HYPERTENSION: ICD-10-CM

## 2024-06-18 DIAGNOSIS — F17.200 SMOKING: ICD-10-CM

## 2024-06-18 DIAGNOSIS — R10.13 DYSPEPSIA: ICD-10-CM

## 2024-06-18 LAB
ALBUMIN SERPL BCP-MCNC: 4.1 G/DL (ref 3.2–4.9)
ALBUMIN/GLOB SERPL: 1.5 G/DL
ALP SERPL-CCNC: 111 U/L (ref 30–99)
ALT SERPL-CCNC: 20 U/L (ref 2–50)
ANION GAP SERPL CALC-SCNC: 12 MMOL/L (ref 7–16)
AST SERPL-CCNC: 16 U/L (ref 12–45)
BASOPHILS # BLD AUTO: 0.6 % (ref 0–1.8)
BASOPHILS # BLD: 0.05 K/UL (ref 0–0.12)
BILIRUB SERPL-MCNC: 0.5 MG/DL (ref 0.1–1.5)
BUN SERPL-MCNC: 16 MG/DL (ref 8–22)
CALCIUM ALBUM COR SERPL-MCNC: 9 MG/DL (ref 8.5–10.5)
CALCIUM SERPL-MCNC: 9.1 MG/DL (ref 8.5–10.5)
CHLORIDE SERPL-SCNC: 104 MMOL/L (ref 96–112)
CO2 SERPL-SCNC: 22 MMOL/L (ref 20–33)
CREAT SERPL-MCNC: 0.66 MG/DL (ref 0.5–1.4)
EKG IMPRESSION: NORMAL
EKG IMPRESSION: NORMAL
EOSINOPHIL # BLD AUTO: 0.24 K/UL (ref 0–0.51)
EOSINOPHIL NFR BLD: 3.1 % (ref 0–6.9)
ERYTHROCYTE [DISTWIDTH] IN BLOOD BY AUTOMATED COUNT: 45.4 FL (ref 35.9–50)
GFR SERPLBLD CREATININE-BSD FMLA CKD-EPI: 102 ML/MIN/1.73 M 2
GLOBULIN SER CALC-MCNC: 2.8 G/DL (ref 1.9–3.5)
GLUCOSE SERPL-MCNC: 121 MG/DL (ref 65–99)
HCT VFR BLD AUTO: 43.6 % (ref 42–52)
HGB BLD-MCNC: 14.1 G/DL (ref 14–18)
IMM GRANULOCYTES # BLD AUTO: 0.05 K/UL (ref 0–0.11)
IMM GRANULOCYTES NFR BLD AUTO: 0.6 % (ref 0–0.9)
LYMPHOCYTES # BLD AUTO: 1.38 K/UL (ref 1–4.8)
LYMPHOCYTES NFR BLD: 17.6 % (ref 22–41)
MCH RBC QN AUTO: 30.1 PG (ref 27–33)
MCHC RBC AUTO-ENTMCNC: 32.3 G/DL (ref 32.3–36.5)
MCV RBC AUTO: 93.2 FL (ref 81.4–97.8)
MONOCYTES # BLD AUTO: 0.77 K/UL (ref 0–0.85)
MONOCYTES NFR BLD AUTO: 9.8 % (ref 0–13.4)
NEUTROPHILS # BLD AUTO: 5.34 K/UL (ref 1.82–7.42)
NEUTROPHILS NFR BLD: 68.3 % (ref 44–72)
NRBC # BLD AUTO: 0 K/UL
NRBC BLD-RTO: 0 /100 WBC (ref 0–0.2)
PLATELET # BLD AUTO: 279 K/UL (ref 164–446)
PMV BLD AUTO: 9.3 FL (ref 9–12.9)
POTASSIUM SERPL-SCNC: 4 MMOL/L (ref 3.6–5.5)
PROT SERPL-MCNC: 6.9 G/DL (ref 6–8.2)
RBC # BLD AUTO: 4.68 M/UL (ref 4.7–6.1)
SODIUM SERPL-SCNC: 138 MMOL/L (ref 135–145)
TROPONIN T SERPL-MCNC: 17 NG/L (ref 6–19)
WBC # BLD AUTO: 7.8 K/UL (ref 4.8–10.8)

## 2024-06-18 PROCEDURE — 96375 TX/PRO/DX INJ NEW DRUG ADDON: CPT

## 2024-06-18 PROCEDURE — 99285 EMERGENCY DEPT VISIT HI MDM: CPT

## 2024-06-18 PROCEDURE — 96374 THER/PROPH/DIAG INJ IV PUSH: CPT

## 2024-06-18 PROCEDURE — 84484 ASSAY OF TROPONIN QUANT: CPT

## 2024-06-18 PROCEDURE — 99406 BEHAV CHNG SMOKING 3-10 MIN: CPT

## 2024-06-18 PROCEDURE — 93005 ELECTROCARDIOGRAM TRACING: CPT | Performed by: STUDENT IN AN ORGANIZED HEALTH CARE EDUCATION/TRAINING PROGRAM

## 2024-06-18 PROCEDURE — 93005 ELECTROCARDIOGRAM TRACING: CPT

## 2024-06-18 PROCEDURE — 85025 COMPLETE CBC W/AUTO DIFF WBC: CPT

## 2024-06-18 PROCEDURE — 80053 COMPREHEN METABOLIC PANEL: CPT

## 2024-06-18 ASSESSMENT — FIBROSIS 4 INDEX: FIB4 SCORE: 1.25

## 2024-06-19 ENCOUNTER — APPOINTMENT (OUTPATIENT)
Dept: RADIOLOGY | Facility: MEDICAL CENTER | Age: 69
DRG: 392 | End: 2024-06-19
Attending: STUDENT IN AN ORGANIZED HEALTH CARE EDUCATION/TRAINING PROGRAM
Payer: MEDICARE

## 2024-06-19 LAB
AMPHET UR QL SCN: NEGATIVE
BARBITURATES UR QL SCN: NEGATIVE
BENZODIAZ UR QL SCN: NEGATIVE
BZE UR QL SCN: NEGATIVE
CANNABINOIDS UR QL SCN: NEGATIVE
FENTANYL UR QL: NEGATIVE
GLUCOSE BLD STRIP.AUTO-MCNC: 127 MG/DL (ref 65–99)
METHADONE UR QL SCN: NEGATIVE
OPIATES UR QL SCN: POSITIVE
OXYCODONE UR QL SCN: POSITIVE
PCP UR QL SCN: NEGATIVE
PROPOXYPH UR QL SCN: NEGATIVE
TROPONIN T SERPL-MCNC: 18 NG/L (ref 6–19)
TROPONIN T SERPL-MCNC: 20 NG/L (ref 6–19)
TROPONIN T SERPL-MCNC: 22 NG/L (ref 6–19)

## 2024-06-19 PROCEDURE — 700102 HCHG RX REV CODE 250 W/ 637 OVERRIDE(OP): Performed by: STUDENT IN AN ORGANIZED HEALTH CARE EDUCATION/TRAINING PROGRAM

## 2024-06-19 PROCEDURE — 700102 HCHG RX REV CODE 250 W/ 637 OVERRIDE(OP): Performed by: NURSE PRACTITIONER

## 2024-06-19 PROCEDURE — 302242 IV POLE: Performed by: NURSE PRACTITIONER

## 2024-06-19 PROCEDURE — A9270 NON-COVERED ITEM OR SERVICE: HCPCS | Performed by: STUDENT IN AN ORGANIZED HEALTH CARE EDUCATION/TRAINING PROGRAM

## 2024-06-19 PROCEDURE — 700102 HCHG RX REV CODE 250 W/ 637 OVERRIDE(OP): Performed by: INTERNAL MEDICINE

## 2024-06-19 PROCEDURE — 80307 DRUG TEST PRSMV CHEM ANLYZR: CPT

## 2024-06-19 PROCEDURE — 96375 TX/PRO/DX INJ NEW DRUG ADDON: CPT

## 2024-06-19 PROCEDURE — 84484 ASSAY OF TROPONIN QUANT: CPT | Mod: 91

## 2024-06-19 PROCEDURE — A9270 NON-COVERED ITEM OR SERVICE: HCPCS | Performed by: INTERNAL MEDICINE

## 2024-06-19 PROCEDURE — G0378 HOSPITAL OBSERVATION PER HR: HCPCS

## 2024-06-19 PROCEDURE — 700111 HCHG RX REV CODE 636 W/ 250 OVERRIDE (IP): Mod: JZ | Performed by: NURSE PRACTITIONER

## 2024-06-19 PROCEDURE — 99406 BEHAV CHNG SMOKING 3-10 MIN: CPT | Performed by: INTERNAL MEDICINE

## 2024-06-19 PROCEDURE — 700111 HCHG RX REV CODE 636 W/ 250 OVERRIDE (IP): Performed by: INTERNAL MEDICINE

## 2024-06-19 PROCEDURE — 99223 1ST HOSP IP/OBS HIGH 75: CPT | Performed by: INTERNAL MEDICINE

## 2024-06-19 PROCEDURE — 71045 X-RAY EXAM CHEST 1 VIEW: CPT

## 2024-06-19 PROCEDURE — 82962 GLUCOSE BLOOD TEST: CPT

## 2024-06-19 PROCEDURE — 96376 TX/PRO/DX INJ SAME DRUG ADON: CPT

## 2024-06-19 PROCEDURE — 99215 OFFICE O/P EST HI 40 MIN: CPT | Mod: 25 | Performed by: INTERNAL MEDICINE

## 2024-06-19 PROCEDURE — A9270 NON-COVERED ITEM OR SERVICE: HCPCS | Performed by: NURSE PRACTITIONER

## 2024-06-19 RX ORDER — INSULIN ASPART 100 [IU]/ML
1-3 INJECTION, SOLUTION INTRAVENOUS; SUBCUTANEOUS
COMMUNITY

## 2024-06-19 RX ORDER — CLOPIDOGREL BISULFATE 75 MG/1
75 TABLET ORAL EVERY MORNING
Status: DISCONTINUED | OUTPATIENT
Start: 2024-06-19 | End: 2024-06-24 | Stop reason: HOSPADM

## 2024-06-19 RX ORDER — ATORVASTATIN CALCIUM 10 MG/1
10 TABLET, FILM COATED ORAL EVERY MORNING
Status: DISCONTINUED | OUTPATIENT
Start: 2024-06-19 | End: 2024-06-19

## 2024-06-19 RX ORDER — ASPIRIN 325 MG
325 TABLET ORAL ONCE
Status: COMPLETED | OUTPATIENT
Start: 2024-06-19 | End: 2024-06-19

## 2024-06-19 RX ORDER — ATORVASTATIN CALCIUM 40 MG/1
40 TABLET, FILM COATED ORAL EVERY MORNING
Status: DISCONTINUED | OUTPATIENT
Start: 2024-06-20 | End: 2024-06-20

## 2024-06-19 RX ORDER — MORPHINE SULFATE 4 MG/ML
2 INJECTION INTRAVENOUS
Status: DISCONTINUED | OUTPATIENT
Start: 2024-06-19 | End: 2024-06-22

## 2024-06-19 RX ORDER — HYDRALAZINE HYDROCHLORIDE 10 MG/1
10 TABLET, FILM COATED ORAL EVERY 8 HOURS
Status: DISCONTINUED | OUTPATIENT
Start: 2024-06-19 | End: 2024-06-24 | Stop reason: HOSPADM

## 2024-06-19 RX ORDER — CARVEDILOL 12.5 MG/1
12.5 TABLET ORAL 2 TIMES DAILY WITH MEALS
Status: DISCONTINUED | OUTPATIENT
Start: 2024-06-19 | End: 2024-06-24 | Stop reason: HOSPADM

## 2024-06-19 RX ORDER — ASPIRIN 81 MG/1
81 TABLET ORAL DAILY
Status: DISCONTINUED | OUTPATIENT
Start: 2024-06-20 | End: 2024-06-24 | Stop reason: HOSPADM

## 2024-06-19 RX ORDER — OMEPRAZOLE 20 MG/1
40 CAPSULE, DELAYED RELEASE ORAL EVERY MORNING
Status: DISCONTINUED | OUTPATIENT
Start: 2024-06-19 | End: 2024-06-24 | Stop reason: HOSPADM

## 2024-06-19 RX ORDER — NITROGLYCERIN 0.4 MG/1
0.4 TABLET SUBLINGUAL
Status: DISCONTINUED | OUTPATIENT
Start: 2024-06-19 | End: 2024-06-19

## 2024-06-19 RX ORDER — LABETALOL HYDROCHLORIDE 5 MG/ML
10 INJECTION, SOLUTION INTRAVENOUS EVERY 4 HOURS PRN
Status: DISCONTINUED | OUTPATIENT
Start: 2024-06-19 | End: 2024-06-24 | Stop reason: HOSPADM

## 2024-06-19 RX ORDER — AMOXICILLIN 250 MG
2 CAPSULE ORAL EVERY EVENING
Status: DISCONTINUED | OUTPATIENT
Start: 2024-06-19 | End: 2024-06-24 | Stop reason: HOSPADM

## 2024-06-19 RX ORDER — ENOXAPARIN SODIUM 100 MG/ML
40 INJECTION SUBCUTANEOUS DAILY
Status: DISCONTINUED | OUTPATIENT
Start: 2024-06-19 | End: 2024-06-19

## 2024-06-19 RX ORDER — METOPROLOL SUCCINATE 25 MG/1
25 TABLET, EXTENDED RELEASE ORAL EVERY MORNING
Status: DISCONTINUED | OUTPATIENT
Start: 2024-06-19 | End: 2024-06-19

## 2024-06-19 RX ORDER — POLYETHYLENE GLYCOL 3350 17 G/17G
1 POWDER, FOR SOLUTION ORAL
Status: DISCONTINUED | OUTPATIENT
Start: 2024-06-19 | End: 2024-06-24 | Stop reason: HOSPADM

## 2024-06-19 RX ORDER — LIRAGLUTIDE 6 MG/ML
1.2 INJECTION SUBCUTANEOUS DAILY
COMMUNITY

## 2024-06-19 RX ORDER — OXYCODONE HYDROCHLORIDE 5 MG/1
5 TABLET ORAL
Status: DISCONTINUED | OUTPATIENT
Start: 2024-06-19 | End: 2024-06-23

## 2024-06-19 RX ORDER — NICOTINE 21 MG/24HR
21 PATCH, TRANSDERMAL 24 HOURS TRANSDERMAL
Status: DISCONTINUED | OUTPATIENT
Start: 2024-06-19 | End: 2024-06-24 | Stop reason: HOSPADM

## 2024-06-19 RX ORDER — DIPHENHYDRAMINE HYDROCHLORIDE 50 MG/ML
12.5 INJECTION INTRAMUSCULAR; INTRAVENOUS EVERY 6 HOURS PRN
Status: DISCONTINUED | OUTPATIENT
Start: 2024-06-19 | End: 2024-06-24 | Stop reason: HOSPADM

## 2024-06-19 RX ORDER — ASPIRIN 325 MG
325 TABLET ORAL EVERY MORNING
Status: DISCONTINUED | OUTPATIENT
Start: 2024-06-20 | End: 2024-06-19

## 2024-06-19 RX ORDER — RANOLAZINE 500 MG/1
500 TABLET, EXTENDED RELEASE ORAL 2 TIMES DAILY
Status: DISCONTINUED | OUTPATIENT
Start: 2024-06-19 | End: 2024-06-24 | Stop reason: HOSPADM

## 2024-06-19 RX ORDER — ASPIRIN 325 MG
325 TABLET ORAL EVERY MORNING
Status: DISCONTINUED | OUTPATIENT
Start: 2024-06-19 | End: 2024-06-19

## 2024-06-19 RX ORDER — ONDANSETRON 2 MG/ML
4 INJECTION INTRAMUSCULAR; INTRAVENOUS EVERY 4 HOURS PRN
Status: DISCONTINUED | OUTPATIENT
Start: 2024-06-19 | End: 2024-06-24 | Stop reason: HOSPADM

## 2024-06-19 RX ORDER — ISOSORBIDE MONONITRATE 30 MG/1
90 TABLET, EXTENDED RELEASE ORAL EVERY MORNING
Status: DISCONTINUED | OUTPATIENT
Start: 2024-06-19 | End: 2024-06-24 | Stop reason: HOSPADM

## 2024-06-19 RX ORDER — OXYCODONE HYDROCHLORIDE 5 MG/1
2.5 TABLET ORAL
Status: DISCONTINUED | OUTPATIENT
Start: 2024-06-19 | End: 2024-06-23

## 2024-06-19 RX ORDER — LISINOPRIL 20 MG/1
20 TABLET ORAL EVERY MORNING
Status: DISCONTINUED | OUTPATIENT
Start: 2024-06-19 | End: 2024-06-24 | Stop reason: HOSPADM

## 2024-06-19 RX ORDER — ONDANSETRON 4 MG/1
4 TABLET, ORALLY DISINTEGRATING ORAL EVERY 4 HOURS PRN
Status: DISCONTINUED | OUTPATIENT
Start: 2024-06-19 | End: 2024-06-24 | Stop reason: HOSPADM

## 2024-06-19 RX ORDER — KETOROLAC TROMETHAMINE 15 MG/ML
15 INJECTION, SOLUTION INTRAMUSCULAR; INTRAVENOUS EVERY 6 HOURS PRN
Status: DISPENSED | OUTPATIENT
Start: 2024-06-19 | End: 2024-06-24

## 2024-06-19 RX ORDER — DEXTROSE MONOHYDRATE 25 G/50ML
25 INJECTION, SOLUTION INTRAVENOUS
Status: DISCONTINUED | OUTPATIENT
Start: 2024-06-19 | End: 2024-06-24 | Stop reason: HOSPADM

## 2024-06-19 RX ORDER — ISOSORBIDE MONONITRATE 30 MG/1
90 TABLET, EXTENDED RELEASE ORAL EVERY MORNING
Status: ON HOLD | COMMUNITY
End: 2024-06-23

## 2024-06-19 RX ORDER — ACETAMINOPHEN 325 MG/1
650 TABLET ORAL EVERY 6 HOURS PRN
Status: DISCONTINUED | OUTPATIENT
Start: 2024-06-19 | End: 2024-06-24 | Stop reason: HOSPADM

## 2024-06-19 RX ADMIN — HYDRALAZINE HYDROCHLORIDE 10 MG: 10 TABLET, FILM COATED ORAL at 14:31

## 2024-06-19 RX ADMIN — OXYCODONE HYDROCHLORIDE 5 MG: 5 TABLET ORAL at 21:29

## 2024-06-19 RX ADMIN — LISINOPRIL 20 MG: 20 TABLET ORAL at 09:16

## 2024-06-19 RX ADMIN — OMEPRAZOLE 40 MG: 20 CAPSULE, DELAYED RELEASE ORAL at 09:15

## 2024-06-19 RX ADMIN — OXYCODONE HYDROCHLORIDE 5 MG: 5 TABLET ORAL at 17:50

## 2024-06-19 RX ADMIN — MORPHINE SULFATE 2 MG: 4 INJECTION INTRAVENOUS at 09:17

## 2024-06-19 RX ADMIN — MORPHINE SULFATE 2 MG: 4 INJECTION INTRAVENOUS at 15:44

## 2024-06-19 RX ADMIN — ASPIRIN 325 MG: 325 TABLET ORAL at 00:41

## 2024-06-19 RX ADMIN — NITROGLYCERIN 0.5 INCH: 20 OINTMENT TOPICAL at 11:32

## 2024-06-19 RX ADMIN — CARVEDILOL 12.5 MG: 12.5 TABLET, FILM COATED ORAL at 17:50

## 2024-06-19 RX ADMIN — NITROGLYCERIN 0.5 INCH: 20 OINTMENT TOPICAL at 19:43

## 2024-06-19 RX ADMIN — ONDANSETRON 4 MG: 4 TABLET, ORALLY DISINTEGRATING ORAL at 11:31

## 2024-06-19 RX ADMIN — KETOROLAC TROMETHAMINE 15 MG: 15 INJECTION, SOLUTION INTRAMUSCULAR; INTRAVENOUS at 10:37

## 2024-06-19 RX ADMIN — OXYCODONE HYDROCHLORIDE 5 MG: 5 TABLET ORAL at 07:31

## 2024-06-19 RX ADMIN — NITROGLYCERIN 0.4 MG: 0.4 TABLET, ORALLY DISINTEGRATING SUBLINGUAL at 00:41

## 2024-06-19 RX ADMIN — ONDANSETRON 4 MG: 4 TABLET, ORALLY DISINTEGRATING ORAL at 17:50

## 2024-06-19 RX ADMIN — RANOLAZINE 500 MG: 500 TABLET, EXTENDED RELEASE ORAL at 19:44

## 2024-06-19 RX ADMIN — MORPHINE SULFATE 2 MG: 4 INJECTION INTRAVENOUS at 18:53

## 2024-06-19 RX ADMIN — HYDRALAZINE HYDROCHLORIDE 10 MG: 10 TABLET, FILM COATED ORAL at 10:06

## 2024-06-19 RX ADMIN — CLOPIDOGREL BISULFATE 75 MG: 75 TABLET ORAL at 09:16

## 2024-06-19 RX ADMIN — APIXABAN 5 MG: 5 TABLET, FILM COATED ORAL at 09:16

## 2024-06-19 RX ADMIN — MORPHINE SULFATE 2 MG: 4 INJECTION INTRAVENOUS at 22:32

## 2024-06-19 RX ADMIN — OXYCODONE HYDROCHLORIDE 5 MG: 5 TABLET ORAL at 14:30

## 2024-06-19 RX ADMIN — HYDRALAZINE HYDROCHLORIDE 10 MG: 10 TABLET, FILM COATED ORAL at 21:29

## 2024-06-19 RX ADMIN — OXYCODONE HYDROCHLORIDE 5 MG: 5 TABLET ORAL at 11:32

## 2024-06-19 RX ADMIN — MORPHINE SULFATE 2 MG: 4 INJECTION INTRAVENOUS at 13:03

## 2024-06-19 RX ADMIN — DIPHENHYDRAMINE HYDROCHLORIDE 12.5 MG: 50 INJECTION, SOLUTION INTRAMUSCULAR; INTRAVENOUS at 10:10

## 2024-06-19 RX ADMIN — NITROGLYCERIN 0.5 INCH: 20 OINTMENT TOPICAL at 23:04

## 2024-06-19 SDOH — ECONOMIC STABILITY: TRANSPORTATION INSECURITY
IN THE PAST 12 MONTHS, HAS THE LACK OF TRANSPORTATION KEPT YOU FROM MEDICAL APPOINTMENTS OR FROM GETTING MEDICATIONS?: NO

## 2024-06-19 SDOH — ECONOMIC STABILITY: TRANSPORTATION INSECURITY
IN THE PAST 12 MONTHS, HAS LACK OF RELIABLE TRANSPORTATION KEPT YOU FROM MEDICAL APPOINTMENTS, MEETINGS, WORK OR FROM GETTING THINGS NEEDED FOR DAILY LIVING?: NO

## 2024-06-19 ASSESSMENT — ENCOUNTER SYMPTOMS
BLURRED VISION: 0
ORTHOPNEA: 0
DOUBLE VISION: 0
HALLUCINATIONS: 0
SPEECH CHANGE: 0
PHOTOPHOBIA: 0
VOMITING: 0
BACK PAIN: 0
SPUTUM PRODUCTION: 0
POLYDIPSIA: 0
BRUISES/BLEEDS EASILY: 0
FLANK PAIN: 0
WEIGHT LOSS: 0
HEARTBURN: 0
NECK PAIN: 0
NAUSEA: 0
HEMOPTYSIS: 0
TREMORS: 0
PALPITATIONS: 0
NERVOUS/ANXIOUS: 0
FOCAL WEAKNESS: 0
COUGH: 0
CHILLS: 0
FEVER: 0
HEADACHES: 0

## 2024-06-19 ASSESSMENT — LIFESTYLE VARIABLES
EVER FELT BAD OR GUILTY ABOUT YOUR DRINKING: NO
TOTAL SCORE: 0
HAVE YOU EVER FELT YOU SHOULD CUT DOWN ON YOUR DRINKING: NO
ON A TYPICAL DAY WHEN YOU DRINK ALCOHOL HOW MANY DRINKS DO YOU HAVE: 0
CONSUMPTION TOTAL: NEGATIVE
TOTAL SCORE: 0
HAVE PEOPLE ANNOYED YOU BY CRITICIZING YOUR DRINKING: NO
EVER HAD A DRINK FIRST THING IN THE MORNING TO STEADY YOUR NERVES TO GET RID OF A HANGOVER: NO
HOW MANY TIMES IN THE PAST YEAR HAVE YOU HAD 5 OR MORE DRINKS IN A DAY: 0
SUBSTANCE_ABUSE: 0
ALCOHOL_USE: NO
DOES PATIENT WANT TO STOP DRINKING: NO
TOTAL SCORE: 0
AVERAGE NUMBER OF DAYS PER WEEK YOU HAVE A DRINK CONTAINING ALCOHOL: 0

## 2024-06-19 ASSESSMENT — PAIN DESCRIPTION - PAIN TYPE
TYPE: ACUTE PAIN

## 2024-06-19 ASSESSMENT — CHA2DS2 SCORE
AGE 75 OR GREATER: NO
VASCULAR DISEASE: YES
HYPERTENSION: YES
SEX: MALE
CHF OR LEFT VENTRICULAR DYSFUNCTION: NO
AGE 65 TO 74: YES
CHA2DS2 VASC SCORE: 4
DIABETES: YES
PRIOR STROKE OR TIA OR THROMBOEMBOLISM: NO

## 2024-06-19 ASSESSMENT — SOCIAL DETERMINANTS OF HEALTH (SDOH)
IN THE PAST 12 MONTHS, HAS THE ELECTRIC, GAS, OIL, OR WATER COMPANY THREATENED TO SHUT OFF SERVICE IN YOUR HOME?: NO
WITHIN THE PAST 12 MONTHS, THE FOOD YOU BOUGHT JUST DIDN'T LAST AND YOU DIDN'T HAVE MONEY TO GET MORE: NEVER TRUE
WITHIN THE PAST 12 MONTHS, YOU WORRIED THAT YOUR FOOD WOULD RUN OUT BEFORE YOU GOT THE MONEY TO BUY MORE: NEVER TRUE
WITHIN THE LAST YEAR, HAVE TO BEEN RAPED OR FORCED TO HAVE ANY KIND OF SEXUAL ACTIVITY BY YOUR PARTNER OR EX-PARTNER?: PATIENT DECLINED
WITHIN THE LAST YEAR, HAVE YOU BEEN AFRAID OF YOUR PARTNER OR EX-PARTNER?: PATIENT DECLINED
WITHIN THE LAST YEAR, HAVE YOU BEEN KICKED, HIT, SLAPPED, OR OTHERWISE PHYSICALLY HURT BY YOUR PARTNER OR EX-PARTNER?: PATIENT DECLINED
WITHIN THE LAST YEAR, HAVE YOU BEEN HUMILIATED OR EMOTIONALLY ABUSED IN OTHER WAYS BY YOUR PARTNER OR EX-PARTNER?: PATIENT DECLINED

## 2024-06-19 ASSESSMENT — PATIENT HEALTH QUESTIONNAIRE - PHQ9
1. LITTLE INTEREST OR PLEASURE IN DOING THINGS: NOT AT ALL
2. FEELING DOWN, DEPRESSED, IRRITABLE, OR HOPELESS: NOT AT ALL
SUM OF ALL RESPONSES TO PHQ9 QUESTIONS 1 AND 2: 0

## 2024-06-19 ASSESSMENT — FIBROSIS 4 INDEX: FIB4 SCORE: 0.86

## 2024-06-19 NOTE — PROGRESS NOTES
"Hospital medicine progress note after midnight:     Mr. Lindsey \"JUAN\" Varinder Cardoso is a 68 y.o. male with past medical history of CAD, multiple MIs and stents placement a few years ago, A-fib on Eliquis, type 2 diabetes, hypertension, smoking, who presented 6/18/2024 with recurrent chest pain.      He states that chest pain started at 5 PM when he was on a train to [place] where he reportedly lives, and he had to be taken off the train and brought to the emergency department.  Since then the chest pain has been on and off at rest without elevating aggravating factors radiating to the left part of the chest.  Nitroglycerin is not working and he is asking for other medications for pain. Per chart review he was admitted to [facility] in May, had stress test done, offered coronary angiography and left AMA.  There was suspicions that he is seeking opiates.  Currently he rates his pain 7 out of 10. At this time he would agree for coronary angiography if offered. His initial evaluation with repeat troponin, EKG and chest x-ray are nonconcerning for acute MI.     On examination, patient reports that he has a stressful job and is always on the road.  Patient reports patient normally has chest pain with this current chest pain, this feels like one of his previous MI.  Patient endorses that his troponin level usually is negative along with cardiac stress test, however, with his previous MI in the past, this was diagnosed by doing an angiogram.    Patient also noted to have elevated blood pressure with SBP ranging from 150-170s.  Patient claims that he is compliant with all his medication.  Will trial patient on additional hydralazine.  Patient also reports that Nitropaste works better for his chest pain to control blood pressure along with the pain, however, we will hold off until we hear further from cardiology as we are hoping to pursue a NM stress test.    Plan of care: Continue to monitor blood pressure; " added hydralazine 10 mg 3 times daily; HOLD eliquis; PURSUE angiogram per Cardiology - Dr. Knapp    Disposition: Anticipated to stay overnight until chest pain has resolved and recommendations from cardiology has been made    Plan and assessment:  Chest pain  Primary hypertension  Current smoker  DMT2  CAD with multiple stents  HLD  Obesity  Malinger?     Please note that this dictation was created using voice recognition software. I have made every reasonable attempt to correct obvious errors, but there may be errors of grammar and possibly content that I did not discover before finalizing the note.    Electronically signed by:  Dr. JAE Lundy, DNP, APRN, FNP-C  Hospitalist Services  [facility]  [phone]  [email or web address]  06/19/24                  1126

## 2024-06-19 NOTE — ED NOTES
Pt finished eating breakfast and placed back on monitor. Complains of chest pain so medicated per MAR. Skin dry, warm, pink.

## 2024-06-19 NOTE — ASSESSMENT & PLAN NOTE
Patient educated and counseled in regard to smoking cessation  Nicotine replacement therapy added

## 2024-06-19 NOTE — ED TRIAGE NOTES
Chief Complaint   Patient presents with    Chest Pain     Pt reports squeezing CP in center chest radiating to L arm. Pt has hx of 4 MI with 4 stent placements. Pt states this feels similar. Pt was worked up at [facility] and everything was negative, pt called EMS and was brought here for further evaluation.       Pt BIB [facility] to triage via w/c for above complaint. Presents with CP to center chest radiating to L side. Pt states he took Nitro x6 with minimal relief, 324 mg ASA x2, normal eliquis and plavix doses today as well.    Pt back to lobby, educated on triage process and encourage to alert staff of any changes.     Vitals:    06/18/24 2222   BP: (!) 149/77   Pulse: 68   Resp: 17   Temp: 37 °C (98.6 °F)   SpO2: 92%

## 2024-06-19 NOTE — PROGRESS NOTES
"Pt verbalizes to prim RN personal belongings are missing from room after going to restroom. This CRN with primary Rn to bedside. Safe keeping was offered, Pt declined, States,\" I can't I have to work.\" Rn verbalized understanding of pt need to work. Provided requested beverage. CRN suggested safe keeping to ensure belongings would remain safe. Pt states,\" Why are you angry with me\". CRn ensured pt RN was educating and offering solution to keep personal belongings safe. Pt then demands to speak to house supervisor. Unit Manager informed.   Manager and CRN to bedside. Pt showed us what notebook looks like and cord he says is missing. Offered to look for items around unit.   "

## 2024-06-19 NOTE — ED NOTES
Med rec complete per patient's med list (returned)  Allergies reviewed.   Outpatient antibiotics in the last 30 days? No   Anticoagulants taken in the last 14 days? Yes   Anticoagulant: Eliquis, Last dose: 6/18/24 PM.     Whit Howell CPhT

## 2024-06-19 NOTE — PROGRESS NOTES
Upon arrival to the unit, patient was explaining his course of illness. None of his explanations matched up with current medical records. The hospitalist have documented the inconsistencies in the H&P.    He was also boasting that he was currently suing [facility] system and [facility] for misrepresenting him. He continued to explain that he had $20,000 worth of laptops with him.     I offered safekeeping and it was declined.     Cardiology rounded on him while I was in the room. When they were finished, I excused myself and went to the hospitalist to give her a quick update on POC.     The walk from the room was approx 20 feet. As I reached the nurses station, I saw him walk out of his room, toward the bathrooms.     I explained to the APRN that cardio wants to do a heart cath in the morning, they canceled the stress test, and he may resume a diet. That conversation took 2-3 minutes.    Upon walking away from the hospitalist, the Alaina ROGER pulled me aside and told me that the patient was accusing someone of pouring water all over his laptop, and stealing a paper notebook and a laptop .    I immediately told charge nurse, Chanda, and we made the decision to take this straight to our nurse manager, Jaylon

## 2024-06-19 NOTE — ASSESSMENT & PLAN NOTE
Will hold metformin  Observe blood sugars.  Currently resting blood sugar 121  Patient placed on SSI    6/21/2024  Blood glucose controlled  No insulin coverage needed

## 2024-06-19 NOTE — ASSESSMENT & PLAN NOTE
68-year-old male with prior MIs and stents placement, presented with resting chest pain with no effect from nitroglycerin, concern for possible unstable angina  Initial EKG and troponin does not show MI.  Plan: Observe on telemetry, repeat troponin  Consider cardiology consult for coronary angiography.  Will not order stress test since it was done recently and was normal  Will put nitroglycerin ointment.  IV morphine if pain remains uncontrolled  Consulted cardiology Dr. Knapp  -recommendations to pursue [facility]  Nitropaste also added    6/21/2024  Await left heart catheterization.  Continue oxycodone as needed for pain control.  Continuous cardiac monitoring.    6/22/2024  Continuing intermittent chest pain radiating to the neck and left arm.  Continue to receive oxycodone and IV morphine as needed for pain  Continuous pulse oximetry monitoring to monitor for hypoxia and respiratory depression while receiving IV narcotic medications.  Continuous cardiac monitoring  Serial troponins and EKGs as needed  Trial of Carafate and docusate as well as MiraLAX.  Continue home omeprazole  Patient has anaphylactic allergy to lidocaine so cannot receive GI cocktail    6/23/2024  Troponin x 2 negative.  Proceeded with stress testing after receiving IV Ativan as per his demand.  He would not take oral Ativan.  MPI stress testing was negative.  All narcotic pain medications have been discontinued.  I discontinued the IV morphine last night but was resumed by the nocturnist.  Patient should not receive any more narcotic pain medications.  Patient has been informed of this and is agreeable.

## 2024-06-19 NOTE — ED NOTES
Pt to Y56 from G27. Report received from ROMÁN Subramanian and ROMÁN Valencia. SR on monitor. UDS and repeat troponin collected and sent to lab. Pt c/o persistent unchanged, unrelieved substernal CP rad to L jaw and YAN. Admitting APRN contacted for additional analgesia orders.

## 2024-06-19 NOTE — ED NOTES
Placed on 2L NC for RA spO2 87%. SpO2 91% on 2L, increased to 3L NC. SpO2 95% on 3L. Telephone report given to receiving RNWendi.

## 2024-06-19 NOTE — ED NOTES
Pt resting comfortably in [facility]. Breathing unlabored. Bed in the lowest position, side rails up, Call light within reach.

## 2024-06-19 NOTE — ED NOTES
Patient identity verified in lobby. Pt to claudine green 27 from the lobby via wheelchair. changed into gown, monitors on. This RN agrees with triage note. Chart up for ERP.

## 2024-06-19 NOTE — ED PROVIDER NOTES
CHIEF COMPLAINT  Chief Complaint   Patient presents with    Chest Pain     Pt reports squeezing CP in center chest radiating to L arm. Pt has hx of 4 MI with 4 stent placements. Pt states this feels similar. Pt was worked up at [facility] and everything was negative, pt called EMS and was brought here for further evaluation.       LIMITATION TO HISTORY   Select:     HPI    César Cardoso is a 68 y.o. male who presents to the Emergency Department for evaluation of chest pain patient has a history of previous myocardial infarction with 4 stents last stent was placed in 2014 patient reports he is on [employer] going home to [place] got off in [place] went to [facility] for chest pain and was discharged patient called EMS because he had ongoing chest pain he reports is pressure-like radiating to his left shoulder patient reports he has been compliant with his aspirin and Plavix his cardiologist is with the [facility] in [place]    OUTSIDE HISTORIAN(S):  Select:    EXTERNAL RECORDS REVIEWED  Select: LEFT AGAINST MEDICAL ADVISE     Admitting Provider:Neptali Lopez MD  Discharge Provider: No att. providers found  Primary Care Physician at Discharge: Pcp None     Admission Date: 5/1/2024      Discharge Date: 5/4/2024     Admission Diagnoses  Present on Admission:   Chest pain, unspecified type   Diabetes mellitus (HCC)   CAD (coronary artery disease)   Essential hypertension   Hyperlipidemia   Cigarette nicotine dependence, uncomplicated     Hospital Course  César Cardoso is a 68 y.o. male with PMH of CAD, AFib, CHF, diabetes, hypertension, tobacco dependence who presents with  Chest pain, unspecified type.  Patient presented to the emergency department via transfer outside facility due to chest pain.  Patient states he has chronic chest pain and has been multiple hospitals in emergency department's over the last few years.  He is unsure when his last stress test was approximately 6  months ago and states it was abnormal.  Does not know what his last heart catheterization was.  No recent echocardiogram either.  Previous medical records indicate multiple hospitalizations that resulted in patient leaving AMA.  Outside facility noted in initial positive troponin of 25, cutoff range is 22.  Repeat troponin was 22.  With no acute EKG changes.  [facility] ER workup noted troponin negative x2.  Patient was initially placed on a nitroglycerin drip with no improvement in chest pain and this was discontinued.  Hospitalist service was consulted to admit for chest pain.  After admission, stress test showed probably normal study, there is no definite evidence for stress induced ischemia or myocardial infarction.  Moderately reduced counts in the mid to basal inferior wall, appears worse on rest, probably soft tissue attenuation artifact.  In addition there was left ventricular wall diffuse hypokinesis.  Normal left ventricular size and mildly reduced LVEF of 49 %.  LEFT HEART CATHETERIZATION WAS PLANNED FOR 5/3, AND PATIENT SUDDENLY REVERSE DISPOSITION AND CANCELED PROCEDURE.  GREAT CARE WAS TAKEN IN TRYING TO UNDERSTAND PATIENT RATIONALE.  PATIENT IS RELATE A STORY WHERE IN HE STATED HIS WIFE WAS TRAVELING FROM [place] AND COULD NOT MAKE IT IN TIME FOR PROCEDURE.  DISCUSSED WITH CARDIOLOGY WHO WAS KIND IN RESCHEDULING PROCEDURE FOR 5/FOR TO ACCOMMODATE HIS REQUEST SO THAT HIS WIFE COULD BE HERE.  PATIENT THEN DECIDED AFTER SOME TIME AGAIN THAT HE WOULD NOT GO THROUGH WITH PROCEDURE AND AGAIN DECIDED TO LEAVE THE HOSPITAL AGAINST MEDICAL ADVICE CONSISTENT WITH NUMEROUS PRIOR HOSPITALIZATIONS ACROSS THE COUNTRY.  STRONG SUSPICION THAT PATIENT IS MALINGERING TO OBTAIN IV OPIOIDS.         PAST MEDICAL HISTORY  Past Medical History:   Diagnosis Date    CAD (coronary artery disease)     CAD (coronary artery disease) 8/24/2013    CHEST PAIN 8/24/2013    HLD (hyperlipidemia) 8/24/2013    HTN (hypertension) 8/24/2013     Hypercholesteremia     Hypertension     MI (myocardial infarction) (HCC)     Occluded coronary artery stent      .    SURGICAL HISTORY  Past Surgical History:   Procedure Laterality Date    OTHER CARDIAC SURGERY  2008    3 stents on seperate ocassions    ANGIOPLASTY      OTHER CARDIAC SURGERY      stents x 3         FAMILY HISTORY  No family history on file.       SOCIAL HISTORY  Social History     Socioeconomic History    Marital status:      Spouse name: Not on file    Number of children: Not on file    Years of education: Not on file    Highest education level: Not on file   Occupational History    Not on file   Tobacco Use    Smoking status: Every Day     Current packs/day: 0.50     Types: Cigarettes    Smokeless tobacco: Never   Vaping Use    Vaping status: Never Used   Substance and Sexual Activity    Alcohol use: Not Currently     Comment: 2-3 week    Drug use: No    Sexual activity: Not on file   Other Topics Concern    Not on file   Social History Narrative    ** Merged History Encounter **          Social Determinants of Health     Financial Resource Strain: Low Risk  (5/1/2024)    Received from [facility]    Overall Financial Resource Strain (CARDIA)     Difficulty of Paying Living Expenses: Not hard at all   Food Insecurity: No Food Insecurity (5/1/2024)    Received from [facility]    Hunger Vital Sign     Worried About Running Out of Food in the Last Year: Never true     Ran Out of Food in the Last Year: Never true   Transportation Needs: No Transportation Needs (5/1/2024)    Received from [facility]    PRAPARE - Transportation     Lack of Transportation (Medical): No     Lack of Transportation (Non-Medical): No   Physical Activity: Not on file   Stress: Not on file   Social Connections: Unknown (2/22/2024)    Received from [facility]    Social Connections     In the past 3  months, do you feel that you lack companionship or social support?: Not on file   Intimate Partner Violence: Not At Risk (5/1/2024)    Received from [facility]    Interpersonal Safety     Safe in Home: Yes     Are you in immediate danger?: Not on file     Is your partner at the health facility now?: Not on file     Do you want to (or have to) go home with your partner?: Not on file     Do you have someplace safe to go?: Not on file     Have there been threats or direct abuse of you or your children?: No     When did the abuse occur?: Not on file     Do you feel you are still at risk?: Not on file     Are you in contact with your ex-partner or do you share children or custody?: Not on file     Are you afraid your life may be in danger?: Not on file     Has the violence gotten worse or is it getting scarier? More often?: Not on file     Has anyone ever choked or tried to choke you?: No     Do you feel you are still at risk for choking?: Not on file     Are you in contact with ex-partner who choked or attempted to choke you? or do you share children or custody?: Not on file     Are you afraid your life may be in danger due to choking?: Not on file     Has the choking gotten worse or is it getting scarier? More often?: Not on file     Has your partner used weapons, alcohol or drugs?: Not on file     Has your partner ever held you or your children against your will?: Not on file     Does your partner ever watch you closely, follow you or stalk you?: Not on file     Has your partner ever threatened to kill you, him/herself or your children?: Not on file     When did the choking or choking attempt occur?: Not on file     Do you feel you are still at risk for choking?: Not on file     Safe in Relationship: Yes   Housing Stability: Low Risk  (5/1/2024)    Received from [facility]    Housing Stability Vital Sign     In the last 12 months, was there  a time when you were not able to pay the mortgage or rent on time?: No     In the past 12 months, how many times have you moved where you were living?: 0     At any time in the past 12 months, were you homeless or living in a shelter (including now)?: No         CURRENT MEDICATIONS  No current facility-administered medications on file prior to encounter.     Current Outpatient Medications on File Prior to Encounter   Medication Sig Dispense Refill    aspirin EC (ECOTRIN) 325 MG Tablet Delayed Response Take 650 mg by mouth one time.      apixaban (ELIQUIS) 5mg Tab Take 5 mg by mouth 2 times a day.      Multiple Vitamins-Minerals (CENTRUM SILVER 50+MEN PO) Take 1 Tablet by mouth every day.      Iron-Vitamins (GERITOL PO) Take 1 Tablet by mouth every day.      isosorbide mononitrate SR (IMDUR) 30 MG TABLET SR 24 HR Take 1 Tablet by mouth every morning. 30 Tablet 0    furosemide (LASIX) 20 MG Tab Take 20 mg by mouth 2 times a day.      omeprazole (PRILOSEC) 40 MG delayed-release capsule Take 40 mg by mouth every morning.      metoprolol SR (TOPROL XL) 25 MG TABLET SR 24 HR Take 25 mg by mouth every morning.      Semaglutide,0.25 or 0.5MG/DOS, (OZEMPIC, 0.25 OR 0.5 MG/DOSE,) 2 MG/1.5ML Solution Pen-injector Inject 0.5 mg under the skin every thursday.      Empagliflozin-metFORMIN HCl ER (SYNJARDY XR) 12.5-1000 MG TABLET SR 24 HR Take 1 Tablet by mouth 2 times a day.      Non Formulary Request Take 1 Packet by mouth every morning. ** DAILY DIABETES HEALTH PACK **      atorvastatin (LIPITOR) 10 MG Tab Take 10 mg by mouth every morning.      lisinopril (PRINIVIL) 20 MG Tab Take 20 mg by mouth every morning.      nitroglycerin (NITROSTAT) 0.4 MG SUBL Place 0.4 mg under tongue every 5 minutes as needed. Indications: Chest pain      clopidogrel (PLAVIX) 75 MG TABS Take 75 mg by mouth every morning.      aspirin (ASA) 325 MG TABS Take 325 mg by mouth every morning.             ALLERGIES  Allergies   Allergen Reactions     "Lidocaine Hcl Anaphylaxis     \"All irvin.\"    Ketorolac Tromethamine Hives    Metoclopramide Anxiety     Claustrophobia.       PHYSICAL EXAM  VITAL SIGNS:/79   Pulse 66   Temp 37 °C (98.6 °F) (Temporal)   Resp 20   Ht 1.676 m (5' 6\")   Wt 88.5 kg (195 lb)   SpO2 89%   BMI 31.47 kg/m²       GENERAL: Awake and alert  HEAD: Normocephalic and atraumatic  NECK: Normal range of motion, without meningismus  EYES: Pupils Equal, Round, Reactive to Light, extraocular movements intact, conjunctiva white  ENT: Mucous membranes moist, oropharynx clear  PULMONARY: Normal effort, clear to auscultation  CARDIOVASCULAR: No murmurs, clicks or rubs, peripheral pulses 2+  ABDOMINAL: Soft, non-tender, no guarding or rigidity present, no pulsatile masses  BACK: no midline tenderness, no costovertebral tenderness  NEUROLOGICAL: Grossly non-focal neurological examination, speech normal, gait normal  EXTREMITIES: No edema, normal to inspection  SKIN: Warm and dry.  PSYCHIATRIC: Affect is appropriate    DIAGNOSTIC STUDIES / PROCEDURES  EKG  EKG Interpretation: I independently reviewed the below EKG and did not see signs of a STEMI  Results for orders placed or performed during the hospital encounter of 24   EKG (NOW)   Result Value Ref Range    Report       [facility] Emergency Dept.    Test Date:  2024  Pt Name:    SHANTELL HARVEY             Department: ER  MRN:        [id number]                      Room:  Gender:     Male                         Technician: [id number]  :        1955                   Requested By:ER TRIAGE PROTOCOL  Order #:    [id number]                    Reading MD:    Measurements  Intervals                                Axis  Rate:       63                           P:          54  MT:         148                          QRS:        -28  QRSD:       101                          T:          53  QT:         411  QTc:        421    Interpretive Statements  Sinus " rhythm  Left ventricular hypertrophy  Anterior Q waves, possibly due to LVH  Compared to ECG 01/02/2023 06:32:46  Left ventricular hypertrophy now present  Q waves now present             LABS  Labs Reviewed   CBC WITH DIFFERENTIAL - Abnormal; Notable for the following components:       Result Value    RBC 4.68 (*)     Lymphocytes 17.60 (*)     All other components within normal limits   COMP METABOLIC PANEL - Abnormal; Notable for the following components:    Glucose 121 (*)     Alkaline Phosphatase 111 (*)     All other components within normal limits   TROPONIN   TROPONIN   ESTIMATED GFR         RADIOLOGY  I have independently interpreted the diagnostic imaging associated with this visit and am waiting the final reading from the radiologist.   My preliminary interpretation is as follows: No pneumothorax    COURSE & MEDICAL DECISION MAKING    ED COURSE:        INTERVENTIONS BY ME:  Medications   nitroglycerin (Nitrostat) tablet 0.4 mg (0.4 mg Sublingual Given 6/19/24 0041)   aspirin (Asa) tablet 325 mg (325 mg Oral Given 6/19/24 0041)       Response on recheck:  4:30 AM patient asleep resting comfortably in no distress he reports his pain is improved discussed admission versus discharge home he is adamant he will not leave AGAINST MEDICAL ADVICE and is willing to stay in the hospital and wants to stay in the hospital does not want to be discharged    INITIAL ASSESSMENT, COURSE AND PLAN  Care Narrative:   Patient with history of coronary artery disease presenting with chest pain was admitted a month ago with plan for left heart catheterization however patient left AGAINST MEDICAL ADVICE, today with chest pain rating to his shoulder alleviated by nitroglycerin do not suspect unstable angina as he is resting comfortably in no distress asleep at time of admission symptoms and workup and vital signs are not consistent with pulmonary embolism.  EKG symptomatology and troponins are not consistent with an acute coronary  syndrome      --Pt risk-stratified as moderate risk for MACE in the next 6 weeks by low HEART Score (0-3): 6    HISTORY  Highly suspicious +2    EKG    Normal 0  AGE  = 66 +2    RISK FACTORS  Hypercholesterolemia, HTN, DM, Cigarette smoking, positive family history, obesity  = 3 risk factors or history of atherosclerotic disease +2    TROPONIN    = normal limit 0     ADDITIONAL PROBLEM LIST    DISPOSITION AND DISCUSSIONS  Discussion of management with other QHP or appropriate source(s): None     I have discussed management of the patient with the following physicians and AVANI's:   I disccused the mangament and decision to admit this patient with the Hospitalist. Dr. Harvey in guarded condition      FINAL DIAGNOSIS  1. Chest pain, unspecified type    2. History of coronary artery disease             Electronically signed by: Lico Echeverria DO ,5:08 AM 06/19/24

## 2024-06-19 NOTE — H&P
Hospital Medicine History & Physical Note    Date of Service  6/19/2024    Primary Care Physician  Sarkis Russell M.D.        Code Status  Full Code    Chief Complaint  Chief Complaint   Patient presents with    Chest Pain     Pt reports squeezing CP in center chest radiating to L arm. Pt has hx of 4 MI with 4 stent placements. Pt states this feels similar. Pt was worked up at [facility] and everything was negative, pt called EMS and was brought here for further evaluation.       History of Presenting Illness  César Cardoso is a 68 y.o. male with past medical history of CAD, multiple MIs and stents placement a few years ago, A-fib on Eliquis, type 2 diabetes, hypertension, smoking, who presented 6/18/2024 with recurrent chest pain.  He states that chest pain started at 5 PM when he was on a train to [place] where he reportedly lives, and he had to be taken off the train and brought to the emergency department.  Since then the chest pain has been on and off at rest without elevating aggravating factors radiating to the left part of the chest.  Nitroglycerin is not working and he is asking for other medications for pain.  Per chart review he was admitted to [facility] in May, had stress test done, offered coronary angiography and left AMA.  There was suspicions that he is seeking opiates.  Currently he rates his pain 7 out of 10.  At this time he would agree for coronary angiography if offered.  His initial evaluation with repeat troponin, EKG and chest x-ray are nonconcerning for acute MI    I discussed the plan of care with patient and ERP .    Review of Systems  Review of Systems   Constitutional:  Negative for chills, fever and weight loss.   HENT:  Negative for ear pain, hearing loss and tinnitus.    Eyes:  Negative for blurred vision, double vision and photophobia.   Respiratory:  Negative for cough, hemoptysis and sputum production.    Cardiovascular:  Positive for chest pain.  "Negative for palpitations and orthopnea.   Gastrointestinal:  Negative for heartburn, nausea and vomiting.   Genitourinary:  Negative for dysuria, flank pain, frequency and hematuria.   Musculoskeletal:  Negative for back pain, joint pain and neck pain.   Skin:  Negative for itching and rash.   Neurological:  Negative for tremors, speech change, focal weakness and headaches.   Endo/Heme/Allergies:  Negative for environmental allergies and polydipsia. Does not bruise/bleed easily.   Psychiatric/Behavioral:  Negative for hallucinations and substance abuse. The patient is not nervous/anxious.        Past Medical History   has a past medical history of CAD (coronary artery disease), CAD (coronary artery disease) (8/24/2013), CHEST PAIN (8/24/2013), HLD (hyperlipidemia) (8/24/2013), HTN (hypertension) (8/24/2013), Hypercholesteremia, Hypertension, MI (myocardial infarction) (HCC), and Occluded coronary artery stent.    Surgical History   has a past surgical history that includes angioplasty; other cardiac surgery; and other cardiac surgery (2008).     Family History  family history is not on file.   Family history reviewed with patient. There is no family history that is pertinent to the chief complaint.     Social History   reports that he has been smoking cigarettes. He has never used smokeless tobacco. He reports that he does not currently use alcohol. He reports that he does not use drugs.    Allergies  Allergies   Allergen Reactions    Lidocaine Hcl Anaphylaxis     \"All irvin.\"    Ketorolac Tromethamine Hives    Metoclopramide Anxiety     Claustrophobia.       Medications  Prior to Admission Medications   Prescriptions Last Dose Informant Patient Reported? Taking?   Empagliflozin-metFORMIN HCl ER (SYNJARDY XR) 12.5-1000 MG TABLET SR 24 HR  Patient Yes No   Sig: Take 1 Tablet by mouth 2 times a day.   Iron-Vitamins (GERITOL PO)  Patient Yes No   Sig: Take 1 Tablet by mouth every day.   Multiple Vitamins-Minerals " (CENTRUM SILVER 50+MEN PO)  Patient Yes No   Sig: Take 1 Tablet by mouth every day.   Non Formulary Request  Patient Yes No   Sig: Take 1 Packet by mouth every morning. ** DAILY DIABETES HEALTH PACK **   Semaglutide,0.25 or 0.5MG/DOS, (OZEMPIC, 0.25 OR 0.5 MG/DOSE,) 2 MG/1.5ML Solution Pen-injector  Patient Yes No   Sig: Inject 0.5 mg under the skin every thursday.   apixaban (ELIQUIS) 5mg Tab  Patient Yes No   Sig: Take 5 mg by mouth 2 times a day.   aspirin (ASA) 325 MG TABS  Patient Yes No   Sig: Take 325 mg by mouth every morning.   aspirin EC (ECOTRIN) 325 MG Tablet Delayed Response  Patient Yes No   Sig: Take 650 mg by mouth one time.   atorvastatin (LIPITOR) 10 MG Tab  Patient Yes No   Sig: Take 10 mg by mouth every morning.   clopidogrel (PLAVIX) 75 MG TABS  Patient Yes No   Sig: Take 75 mg by mouth every morning.   furosemide (LASIX) 20 MG Tab  Patient Yes No   Sig: Take 20 mg by mouth 2 times a day.   isosorbide mononitrate SR (IMDUR) 30 MG TABLET SR 24 HR   No No   Sig: Take 1 Tablet by mouth every morning.   lisinopril (PRINIVIL) 20 MG Tab  Patient Yes No   Sig: Take 20 mg by mouth every morning.   metoprolol SR (TOPROL XL) 25 MG TABLET SR 24 HR  Patient Yes No   Sig: Take 25 mg by mouth every morning.   nitroglycerin (NITROSTAT) 0.4 MG SUBL  Patient Yes No   Sig: Place 0.4 mg under tongue every 5 minutes as needed. Indications: Chest pain   omeprazole (PRILOSEC) 40 MG delayed-release capsule  Patient Yes No   Sig: Take 40 mg by mouth every morning.      Facility-Administered Medications: None       Physical Exam  Temp:  [37 °C (98.6 °F)] 37 °C (98.6 °F)  Pulse:  [61-70] 66  Resp:  [15-22] 20  BP: (111-149)/(54-79) 132/79  SpO2:  [88 %-92 %] 89 %  Blood Pressure : 132/79   Temperature: 37 °C (98.6 °F)   Pulse: 66   Respiration: 20   Pulse Oximetry: 89 %       Physical Exam  Vitals and nursing note reviewed.   Constitutional:       General: He is not in acute distress.     Appearance: Normal appearance.  "  HENT:      Head: Normocephalic and atraumatic.      Nose: Nose normal.      Mouth/Throat:      Mouth: Mucous membranes are moist.   Eyes:      Extraocular Movements: Extraocular movements intact.      Pupils: Pupils are equal, round, and reactive to light.   Cardiovascular:      Rate and Rhythm: Normal rate and regular rhythm.   Pulmonary:      Effort: Pulmonary effort is normal.      Breath sounds: Normal breath sounds.   Abdominal:      General: Abdomen is flat. There is no distension.      Tenderness: There is no abdominal tenderness.   Musculoskeletal:         General: No swelling or deformity. Normal range of motion.      Cervical back: Normal range of motion and neck supple.   Skin:     General: Skin is warm and dry.   Neurological:      General: No focal deficit present.      Mental Status: He is alert and oriented to person, place, and time.   Psychiatric:         Mood and Affect: Mood normal.         Behavior: Behavior normal.         Laboratory:  Recent Labs     06/18/24  2302   WBC 7.8   RBC 4.68*   HEMOGLOBIN 14.1   HEMATOCRIT 43.6   MCV 93.2   MCH 30.1   MCHC 32.3   RDW 45.4   PLATELETCT 279   MPV 9.3     Recent Labs     06/18/24  2302   SODIUM 138   POTASSIUM 4.0   CHLORIDE 104   CO2 22   GLUCOSE 121*   BUN 16   CREATININE 0.66   CALCIUM 9.1     Recent Labs     06/18/24  2302   ALTSGPT 20   ASTSGOT 16   ALKPHOSPHAT 111*   TBILIRUBIN 0.5   GLUCOSE 121*         No results for input(s): \"NTPROBNP\" in the last 72 hours.      Recent Labs     06/18/24  2302 06/19/24  0333   TROPONINT 17 18       Imaging:  DX-CHEST-PORTABLE (1 VIEW)   Final Result         1.  No acute cardiopulmonary disease.          X-Ray:  I have personally reviewed the images and compared with prior images.    Assessment/Plan:  Justification for Admission Status  I anticipate this patient is appropriate for observation status at this time because chest pain    Patient will need a Telemetry bed on MEDICAL service .  The need is secondary " to chest pain    * Chest pain- (present on admission)  Assessment & Plan  68-year-old male with prior MIs and stents placement, presented with resting chest pain with no effect from nitroglycerin, concern for possible unstable angina  Initial EKG and troponin does not show MI.  Plan: Observe on telemetry, repeat troponin  Consider cardiology consult for coronary angiography.  Will not order stress test since it was done recently and was normal  Will put nitroglycerin ointment.  IV morphine if pain remains uncontrolled    Type 2 diabetes mellitus with hyperglycemia, without long-term current use of insulin (HCC)- (present on admission)  Assessment & Plan  Will hold metformin  Observe blood sugars.  Currently resting blood sugar 121    Smoking- (present on admission)  Assessment & Plan  Spent approx 5 mins on Tobacco cessation education . Discussed options of nicotine patch, medical treatment with wellbutrin and chantix. Discussed the benefits of quitting smoking and risks of continued smoking including cardiovascular disease, cancer and COPD.   Code 28410      Primary hypertension- (present on admission)  Assessment & Plan  Continue lisinopril, metoprolol  Monitor blood pressure        VTE prophylaxis: enoxaparin ppx

## 2024-06-19 NOTE — CONSULTS
CARDIOLOGY CONSULTATION NOTE      Reason of Consult: Chest pain    Consulting Provider: Ban CHISHOLM    HPI:  68 M with controlled T2DM, DL, HTN, class 1 obesity, A fib on Apixaban, CAD with report of 3 LAD and 1 Diag stents ([facility], done at [facility], no stent cards available) most recent in 2014 on ASA 325mg + Plavix 75mg, lives in [place] was around [place] (has a home in [facility]) for work and had CP while on the train hence presented here. Had lydia scan reportedly normal 5/2024 (at [facility]) and was advised cath but he declined.     He has noticed more frequent episodes of CP over last 4-6 weeks needing more SLN at home. Was on ranexa at some point, and Imdur was at 120mg at some point as well. CP is random, can occur at rest or with exertion. No associated symptoms.    Reportedly previous caths unsuccessful through either radials.    SBP at home 140's, sometimes higher than 180.    Some notes in his chart mention malingering and reportedly had a cor CTA at [facility] in 2023 that reported mild CAD and no stents.    Resumed smoking 4 cig a day since 12/2023 after his parents' deaths.    PCP team at [facility]    No bleeding concerns on triple therapy        Past Medical History:   Diagnosis Date    CAD (coronary artery disease)     CAD (coronary artery disease) 8/24/2013    CHEST PAIN 8/24/2013    HLD (hyperlipidemia) 8/24/2013    HTN (hypertension) 8/24/2013    Hypercholesteremia     Hypertension     MI (myocardial infarction) (HCC)     Occluded coronary artery stent        Social History     Socioeconomic History    Marital status:      Spouse name: Not on file    Number of children: Not on file    Years of education: Not on file    Highest education level: Not on file   Occupational History    Not on file   Tobacco Use    Smoking status: Every Day     Current packs/day: 0.50     Types: Cigarettes    Smokeless tobacco: Never   Vaping Use    Vaping status: Never Used   Substance and Sexual  Activity    Alcohol use: Not Currently     Comment: 2-3 week    Drug use: No    Sexual activity: Not on file   Other Topics Concern    Not on file   Social History Narrative    ** Merged History Encounter **          Social Determinants of Health     Financial Resource Strain: Low Risk  (5/1/2024)    Received from [facility] and [facility]    Overall Financial Resource Strain (CARDIA)     Difficulty of Paying Living Expenses: Not hard at all   Food Insecurity: No Food Insecurity (5/1/2024)    Received from [facility] and [facility]    Hunger Vital Sign     Worried About Running Out of Food in the Last Year: Never true     Ran Out of Food in the Last Year: Never true   Transportation Needs: No Transportation Needs (5/1/2024)    Received from [facility] and [facility]    PRAPARE - Transportation     Lack of Transportation (Medical): No     Lack of Transportation (Non-Medical): No   Physical Activity: Not on file   Stress: Not on file   Social Connections: Unknown (2/22/2024)    Received from [facility]    Social Connections     In the past 3 months, do you feel that you lack companionship or social support?: Not on file   Intimate Partner Violence: Not At Risk (5/1/2024)    Received from [facility] and [facility]    Interpersonal Safety     Safe in Home: Yes     Are you in immediate danger?: Not on file     Is your partner at the health facility now?: Not on file     Do you want to (or have to) go home with your partner?: Not on file     Do you have someplace safe to go?: Not on file     Have there been threats or direct abuse of you or your children?: No     When did the abuse occur?: Not on file     Do you feel you are still at risk?: Not on file     Are you in contact with your ex-partner or do you share children or custody?: Not on file     Are you afraid your life may be in danger?: Not on file     Has the violence  gotten worse or is it getting scarier? More often?: Not on file     Has anyone ever choked or tried to choke you?: No     Do you feel you are still at risk for choking?: Not on file     Are you in contact with ex-partner who choked or attempted to choke you? or do you share children or custody?: Not on file     Are you afraid your life may be in danger due to choking?: Not on file     Has the choking gotten worse or is it getting scarier? More often?: Not on file     Has your partner used weapons, alcohol or drugs?: Not on file     Has your partner ever held you or your children against your will?: Not on file     Does your partner ever watch you closely, follow you or stalk you?: Not on file     Has your partner ever threatened to kill you, him/herself or your children?: Not on file     When did the choking or choking attempt occur?: Not on file     Do you feel you are still at risk for choking?: Not on file     Safe in Relationship: Yes   Housing Stability: Low Risk  (5/1/2024)    Received from [facility]    Housing Stability Vital Sign     In the last 12 months, was there a time when you were not able to pay the mortgage or rent on time?: No     In the past 12 months, how many times have you moved where you were living?: 0     At any time in the past 12 months, were you homeless or living in a shelter (including now)?: No       No current facility-administered medications on file prior to encounter.     Current Outpatient Medications on File Prior to Encounter   Medication Sig Dispense Refill    Empagliflozin (JARDIANCE) 25 MG Tab Take 25 mg by mouth every day.      metFORMIN (GLUCOPHAGE) 500 MG Tab Take 500 mg by mouth every day.      liraglutide (VICTOZA) 18 MG/3ML Solution Pen-injector Inject 1.2 mg under the skin every day.      isosorbide mononitrate SR (IMDUR) 30 MG TABLET SR 24 HR Take 90 mg by mouth every morning. 90 mg = 3 tablets      insulin aspart (NOVOLOG) 100  UNIT/ML Solution Inject 1-3 Units under the skin 1 time a day as needed for High Blood Sugar (BS > 200).      apixaban (ELIQUIS) 5mg Tab Take 5 mg by mouth 2 times a day.      furosemide (LASIX) 20 MG Tab Take 20 mg by mouth every day.      omeprazole (PRILOSEC) 40 MG delayed-release capsule Take 40 mg by mouth every morning.      metoprolol SR (TOPROL XL) 25 MG TABLET SR 24 HR Take 25 mg by mouth every morning.      atorvastatin (LIPITOR) 10 MG Tab Take 10 mg by mouth every morning.      lisinopril (PRINIVIL) 10 MG Tab Take 10 mg by mouth every morning.      nitroglycerin (NITROSTAT) 0.4 MG SUBL Place 0.4 mg under tongue every 5 minutes as needed. Indications: Chest pain      clopidogrel (PLAVIX) 75 MG TABS Take 75 mg by mouth every morning.      aspirin (ASA) 325 MG TABS Take 325 mg by mouth every morning.         Current Facility-Administered Medications   Medication Dose Frequency Provider Last Rate Last Admin    labetalol (Normodyne/Trandate) injection 10 mg  10 mg Q4HRS PRN Bert Harvey M.D.        senna-docusate (Pericolace Or Senokot S) 8.6-50 MG per tablet 2 Tablet  2 Tablet Q EVENING Bert Harvey M.D.        And    polyethylene glycol/lytes (Miralax) Packet 1 Packet  1 Packet QDAY PRN Bert Harvey M.D.        acetaminophen (Tylenol) tablet 650 mg  650 mg Q6HRS PRN Bert Harvey M.D.        Pharmacy Consult Request ...Pain Management Review 1 Each  1 Each PHARMACY TO DOSE Bert Harvey M.D.        oxyCODONE immediate-release (Roxicodone) tablet 2.5 mg  2.5 mg Q3HRS PRN Bert Harvey M.D.        Or    oxyCODONE immediate-release (Roxicodone) tablet 5 mg  5 mg Q3HRS PRN Bert Harvey M.D.   5 mg at 06/19/24 1132    Or    morphine 4 MG/ML injection 2 mg  2 mg Q3HRS PRN Bert Harvey M.D.   2 mg at 06/19/24 0917    ondansetron (Zofran) syringe/vial injection 4 mg  4 mg Q4HRS PRN Bert Harvey M.D.        ondansetron (Zofran ODT) dispertab 4 mg  4 mg Q4HRS PRN Bert  "NANDO Harvey   4 mg at 06/19/24 1131    [Held by provider] apixaban (Eliquis) tablet 5 mg  5 mg BID Bert Harvey M.D.   5 mg at 06/19/24 0916    clopidogrel (Plavix) tablet 75 mg  75 mg SOHA Harvey M.D.   75 mg at 06/19/24 0916    isosorbide mononitrate SR (Imdur) tablet 90 mg  90 mg SOHA Harvey M.D.        lisinopril (Prinivil) tablet 20 mg  20 mg SOHA Harvey M.D.   20 mg at 06/19/24 0916    omeprazole (PriLOSEC) capsule 40 mg  40 mg SOHA Harvey M.D.   40 mg at 06/19/24 0915    nicotine (Nicoderm) 21 MG/24HR 21 mg  21 mg Daily-0600 Bert Harvey M.D.        nitroglycerin (Nitro-Bid) 2 % ointment 0.5 Inch  0.5 Inch Q6HRS Bert Harvey M.D.   0.5 Inch at 06/19/24 1132    hydrALAZINE (Apresoline) tablet 10 mg  10 mg Q8HRS Geraldene R Ralleca-Llaguno, A.P.R.N.   10 mg at 06/19/24 1006    diphenhydrAMINE (Benadryl) injection 12.5 mg  12.5 mg Q6HRS PRN Geraldene R Ralleca-Llaguno, A.P.R.N.   12.5 mg at 06/19/24 1010    ketorolac (Toradol) 15 MG/ML injection 15 mg  15 mg Q6HRS PRN Geraldene R Ralleca-Llaguno, A.P.R.N.   15 mg at 06/19/24 1037    [START ON 6/20/2024] aspirin EC tablet 81 mg  81 mg DAILY Kai Knapp M.D.        [START ON 6/20/2024] atorvastatin (Lipitor) tablet 40 mg  40 mg QAM Kai Knapp M.D.        ranolazine (Ranexa) 500 MG SR tablet TB12 500 mg  500 mg BID Kai Knapp M.D.        carvedilol (Coreg) tablet 12.5 mg  12.5 mg BID WITH MEALS Kai Knapp M.D.       Last reviewed on 6/19/2024  6:54 AM by Whit Howell PhT     Lidocaine hcl, Ketorolac tromethamine, and Metoclopramide    No family history on file.    ROS: As per HPI all other systems reviewed and negative     Physical Exam   Blood pressure (!) 153/69, pulse 61, temperature 36.3 °C (97.4 °F), temperature source Temporal, resp. rate 18, height 1.676 m (5' 6\"), weight 87.5 kg (192 lb 14.4 oz), SpO2 90%.    Constitutional:  Appears well-developed. In NAD   Neck: No JVD present. "   Cardiovascular: Normal rate. Exam reveals no gallop and no friction rub. Systolic murmur heard.   Pulmonary/Chest: CTAB with fine rhonchi   Abdominal: S/NT/ND BS+   Musculoskeletal:  Pulses present. No atrophy. Strength normal.  Extremities: Exhibits no edema. No clubbing or cyanosis.   Skin: Skin is warm and dry.   Neuro: Non-focal, CN 2-12 intact grossly    No intake or output data in the 24 hours ending 06/19/24 1201    Recent Labs     06/18/24  2302   WBC 7.8   RBC 4.68*   HEMOGLOBIN 14.1   HEMATOCRIT 43.6   MCV 93.2   MCH 30.1   MCHC 32.3   RDW 45.4   PLATELETCT 279   MPV 9.3     Recent Labs     06/18/24  2302   SODIUM 138   POTASSIUM 4.0   CHLORIDE 104   CO2 22   GLUCOSE 121*   BUN 16   CREATININE 0.66   CALCIUM 9.1                         Imaging reviewed    Impressions:  # Unstable angina  # report of prior LAD and Diag stents (failed either radial approaches in the past)  # On triple therapy longterm AC/AP agents   # HTN, DL, class 1 obesity, T2DM (controlled)  # pAF on Apixaban and beta blockers  # current smoker    Recommendations:  - discussed risks and benefits of CA/LHC possible PCI instead of another stress test to better assess his cor anatomy and put his symptoms to rest, and he agrees to proceed. NPO after midnight. Reportedly had rotational atherectomy for prior ISR and they only had success with groin access  - hold Apixaban, he got a dose this am  - decrease ASA to 81mg and continue plavix 75mg  - TTE  - switched metoprolol to Coreg for better BP control  - increased atorva to HI statin dose  - start Ranexa 500mg BID  - consider Imdur increase to 120mg based on his BP progress. Goal <= 130/80mmHg  - check lipid panel     Tobacco cessation counseling and education provided for 4 minutes. Nicotine replacement options provided and highly encouraged cessation. He will do his best.    We will follow. Please contact me with any questions.     Discussed with the referring provider and bedside  nursing.    Please note that this dictation was created using voice recognition software. There may be errors I did not discover before finalizing the note.     Kai Knapp MD, MPH [facility]  Interventional Cardiologist  [facility]   of Clinical Internal Medicine - [facility] Jude SALAS

## 2024-06-19 NOTE — PROGRESS NOTES
Pt arrived to unit via stretcher at 1115. Pt oriented to room, unit, and plan of care. Tele-monitor placed and monitor room notified. All questions answered at this time. Call light within reach; fall precautions in place.

## 2024-06-20 ENCOUNTER — APPOINTMENT (OUTPATIENT)
Dept: CARDIOLOGY | Facility: MEDICAL CENTER | Age: 69
DRG: 392 | End: 2024-06-20
Attending: INTERNAL MEDICINE
Payer: MEDICARE

## 2024-06-20 PROBLEM — I20.0 UNSTABLE ANGINA (HCC): Status: ACTIVE | Noted: 2024-06-20

## 2024-06-20 LAB
ANION GAP SERPL CALC-SCNC: 10 MMOL/L (ref 7–16)
BASOPHILS # BLD AUTO: 0.3 % (ref 0–1.8)
BASOPHILS # BLD: 0.03 K/UL (ref 0–0.12)
BUN SERPL-MCNC: 25 MG/DL (ref 8–22)
CALCIUM SERPL-MCNC: 9 MG/DL (ref 8.5–10.5)
CHLORIDE SERPL-SCNC: 105 MMOL/L (ref 96–112)
CHOLEST SERPL-MCNC: 115 MG/DL (ref 100–199)
CO2 SERPL-SCNC: 20 MMOL/L (ref 20–33)
CREAT SERPL-MCNC: 0.94 MG/DL (ref 0.5–1.4)
EOSINOPHIL # BLD AUTO: 0.4 K/UL (ref 0–0.51)
EOSINOPHIL NFR BLD: 4.1 % (ref 0–6.9)
ERYTHROCYTE [DISTWIDTH] IN BLOOD BY AUTOMATED COUNT: 46 FL (ref 35.9–50)
GFR SERPLBLD CREATININE-BSD FMLA CKD-EPI: 88 ML/MIN/1.73 M 2
GLUCOSE BLD STRIP.AUTO-MCNC: 115 MG/DL (ref 65–99)
GLUCOSE BLD STRIP.AUTO-MCNC: 117 MG/DL (ref 65–99)
GLUCOSE BLD STRIP.AUTO-MCNC: 127 MG/DL (ref 65–99)
GLUCOSE BLD STRIP.AUTO-MCNC: 143 MG/DL (ref 65–99)
GLUCOSE SERPL-MCNC: 155 MG/DL (ref 65–99)
HCT VFR BLD AUTO: 42 % (ref 42–52)
HDLC SERPL-MCNC: 28 MG/DL
HGB BLD-MCNC: 13.4 G/DL (ref 14–18)
IMM GRANULOCYTES # BLD AUTO: 0.04 K/UL (ref 0–0.11)
IMM GRANULOCYTES NFR BLD AUTO: 0.4 % (ref 0–0.9)
LDLC SERPL CALC-MCNC: 51 MG/DL
LYMPHOCYTES # BLD AUTO: 1.49 K/UL (ref 1–4.8)
LYMPHOCYTES NFR BLD: 15.4 % (ref 22–41)
MCH RBC QN AUTO: 30.2 PG (ref 27–33)
MCHC RBC AUTO-ENTMCNC: 31.9 G/DL (ref 32.3–36.5)
MCV RBC AUTO: 94.6 FL (ref 81.4–97.8)
MONOCYTES # BLD AUTO: 0.85 K/UL (ref 0–0.85)
MONOCYTES NFR BLD AUTO: 8.8 % (ref 0–13.4)
NEUTROPHILS # BLD AUTO: 6.84 K/UL (ref 1.82–7.42)
NEUTROPHILS NFR BLD: 71 % (ref 44–72)
NRBC # BLD AUTO: 0 K/UL
NRBC BLD-RTO: 0 /100 WBC (ref 0–0.2)
PLATELET # BLD AUTO: 296 K/UL (ref 164–446)
PMV BLD AUTO: 9.5 FL (ref 9–12.9)
POTASSIUM SERPL-SCNC: 5.2 MMOL/L (ref 3.6–5.5)
RBC # BLD AUTO: 4.44 M/UL (ref 4.7–6.1)
SODIUM SERPL-SCNC: 135 MMOL/L (ref 135–145)
TRIGL SERPL-MCNC: 181 MG/DL (ref 0–149)
WBC # BLD AUTO: 9.7 K/UL (ref 4.8–10.8)

## 2024-06-20 PROCEDURE — 82962 GLUCOSE BLOOD TEST: CPT | Mod: 91

## 2024-06-20 PROCEDURE — 700105 HCHG RX REV CODE 258: Performed by: INTERNAL MEDICINE

## 2024-06-20 PROCEDURE — 700102 HCHG RX REV CODE 250 W/ 637 OVERRIDE(OP): Performed by: NURSE PRACTITIONER

## 2024-06-20 PROCEDURE — 700102 HCHG RX REV CODE 250 W/ 637 OVERRIDE(OP): Performed by: INTERNAL MEDICINE

## 2024-06-20 PROCEDURE — A9270 NON-COVERED ITEM OR SERVICE: HCPCS | Performed by: INTERNAL MEDICINE

## 2024-06-20 PROCEDURE — 36415 COLL VENOUS BLD VENIPUNCTURE: CPT

## 2024-06-20 PROCEDURE — 770020 HCHG ROOM/CARE - TELE (206)

## 2024-06-20 PROCEDURE — 96376 TX/PRO/DX INJ SAME DRUG ADON: CPT

## 2024-06-20 PROCEDURE — 700111 HCHG RX REV CODE 636 W/ 250 OVERRIDE (IP)

## 2024-06-20 PROCEDURE — 80061 LIPID PANEL: CPT

## 2024-06-20 PROCEDURE — 85025 COMPLETE CBC W/AUTO DIFF WBC: CPT

## 2024-06-20 PROCEDURE — A9270 NON-COVERED ITEM OR SERVICE: HCPCS | Performed by: NURSE PRACTITIONER

## 2024-06-20 PROCEDURE — 80048 BASIC METABOLIC PNL TOTAL CA: CPT

## 2024-06-20 PROCEDURE — 96375 TX/PRO/DX INJ NEW DRUG ADDON: CPT

## 2024-06-20 PROCEDURE — 700111 HCHG RX REV CODE 636 W/ 250 OVERRIDE (IP): Performed by: INTERNAL MEDICINE

## 2024-06-20 RX ORDER — DIAZEPAM 5 MG/ML
2.5 INJECTION, SOLUTION INTRAMUSCULAR; INTRAVENOUS ONCE
Status: COMPLETED | OUTPATIENT
Start: 2024-06-20 | End: 2024-06-20

## 2024-06-20 RX ORDER — SODIUM CHLORIDE 9 MG/ML
500 INJECTION, SOLUTION INTRAVENOUS ONCE
Status: COMPLETED | OUTPATIENT
Start: 2024-06-20 | End: 2024-06-20

## 2024-06-20 RX ORDER — HYDROXYZINE HYDROCHLORIDE 25 MG/1
25 TABLET, FILM COATED ORAL 3 TIMES DAILY
Status: DISCONTINUED | OUTPATIENT
Start: 2024-06-20 | End: 2024-06-24 | Stop reason: HOSPADM

## 2024-06-20 RX ORDER — HYDROXYZINE HYDROCHLORIDE 25 MG/1
25 TABLET, FILM COATED ORAL 3 TIMES DAILY PRN
Status: DISCONTINUED | OUTPATIENT
Start: 2024-06-20 | End: 2024-06-20

## 2024-06-20 RX ORDER — ATORVASTATIN CALCIUM 40 MG/1
40 TABLET, FILM COATED ORAL EVERY EVENING
Status: DISCONTINUED | OUTPATIENT
Start: 2024-06-21 | End: 2024-06-24 | Stop reason: HOSPADM

## 2024-06-20 RX ADMIN — MORPHINE SULFATE 2 MG: 4 INJECTION INTRAVENOUS at 02:50

## 2024-06-20 RX ADMIN — MORPHINE SULFATE 2 MG: 4 INJECTION INTRAVENOUS at 15:20

## 2024-06-20 RX ADMIN — OXYCODONE HYDROCHLORIDE 5 MG: 5 TABLET ORAL at 18:26

## 2024-06-20 RX ADMIN — OMEPRAZOLE 40 MG: 20 CAPSULE, DELAYED RELEASE ORAL at 05:05

## 2024-06-20 RX ADMIN — ASPIRIN 81 MG: 81 TABLET, COATED ORAL at 05:05

## 2024-06-20 RX ADMIN — HYDROXYZINE HYDROCHLORIDE 25 MG: 25 TABLET ORAL at 17:24

## 2024-06-20 RX ADMIN — ISOSORBIDE MONONITRATE 90 MG: 30 TABLET, EXTENDED RELEASE ORAL at 05:05

## 2024-06-20 RX ADMIN — MORPHINE SULFATE 2 MG: 4 INJECTION INTRAVENOUS at 06:07

## 2024-06-20 RX ADMIN — CLOPIDOGREL BISULFATE 75 MG: 75 TABLET ORAL at 05:05

## 2024-06-20 RX ADMIN — MORPHINE SULFATE 2 MG: 4 INJECTION INTRAVENOUS at 20:02

## 2024-06-20 RX ADMIN — SODIUM CHLORIDE 500 ML: 9 INJECTION, SOLUTION INTRAVENOUS at 08:38

## 2024-06-20 RX ADMIN — RANOLAZINE 500 MG: 500 TABLET, EXTENDED RELEASE ORAL at 05:07

## 2024-06-20 RX ADMIN — CARVEDILOL 12.5 MG: 12.5 TABLET, FILM COATED ORAL at 17:23

## 2024-06-20 RX ADMIN — OXYCODONE HYDROCHLORIDE 5 MG: 5 TABLET ORAL at 01:48

## 2024-06-20 RX ADMIN — OXYCODONE HYDROCHLORIDE 5 MG: 5 TABLET ORAL at 10:01

## 2024-06-20 RX ADMIN — LISINOPRIL 20 MG: 20 TABLET ORAL at 05:05

## 2024-06-20 RX ADMIN — ATORVASTATIN CALCIUM 40 MG: 40 TABLET, FILM COATED ORAL at 05:06

## 2024-06-20 RX ADMIN — DIAZEPAM 2.5 MG: 10 INJECTION, SOLUTION INTRAMUSCULAR; INTRAVENOUS at 00:32

## 2024-06-20 RX ADMIN — NICOTINE TRANSDERMAL SYSTEM 21 MG: 21 PATCH, EXTENDED RELEASE TRANSDERMAL at 05:06

## 2024-06-20 RX ADMIN — RANOLAZINE 500 MG: 500 TABLET, EXTENDED RELEASE ORAL at 17:23

## 2024-06-20 RX ADMIN — SENNOSIDES AND DOCUSATE SODIUM 2 TABLET: 50; 8.6 TABLET ORAL at 17:24

## 2024-06-20 RX ADMIN — OXYCODONE HYDROCHLORIDE 5 MG: 5 TABLET ORAL at 05:05

## 2024-06-20 RX ADMIN — HYDRALAZINE HYDROCHLORIDE 10 MG: 10 TABLET, FILM COATED ORAL at 05:06

## 2024-06-20 RX ADMIN — OXYCODONE HYDROCHLORIDE 5 MG: 5 TABLET ORAL at 13:52

## 2024-06-20 ASSESSMENT — ENCOUNTER SYMPTOMS
ABDOMINAL PAIN: 0
PND: 0
BRUISES/BLEEDS EASILY: 0
VOMITING: 0
CLAUDICATION: 0
WHEEZING: 0
MYALGIAS: 1
PALPITATIONS: 0
FEVER: 0
DIZZINESS: 0
PSYCHIATRIC NEGATIVE: 1
SPUTUM PRODUCTION: 0
SHORTNESS OF BREATH: 0
NAUSEA: 0
ORTHOPNEA: 0
COUGH: 0
BLOOD IN STOOL: 0
COUGH: 1
GASTROINTESTINAL NEGATIVE: 1
CHILLS: 0
BACK PAIN: 1
EYES NEGATIVE: 1
HEADACHES: 0
WEAKNESS: 1
WEAKNESS: 0

## 2024-06-20 ASSESSMENT — PAIN DESCRIPTION - PAIN TYPE
TYPE: ACUTE PAIN

## 2024-06-20 ASSESSMENT — FIBROSIS 4 INDEX: FIB4 SCORE: 0.81

## 2024-06-20 NOTE — ASSESSMENT & PLAN NOTE
History of this as patient has had multiple admissions not only in our facility but also other facilities around the area    6/23/2024  We will consider discussion with psychiatry to assess if any possible interventions may be available

## 2024-06-20 NOTE — PROGRESS NOTES
Cardiology Follow-up Note    Name:   César Cardoso   YOB: 1955  Age:   68 y.o.  male   MRN:   [id number]        Attending Provider: Dr Jesu Keller*     Chief Complaint: Chest Pain (Pt reports squeezing CP in center chest radiating to L arm. Pt has hx of 4 MI with 4 stent placements. Pt states this feels similar. Pt was worked up at [facility] and everything was negative, pt called EMS and was brought here for further evaluation.)       Reason for cardiology consult: Chest pain    History of Present Illness  César Cardoso is 68 y.o. male with prior medical history significant for diabetes mellitus type 2, dyslipidemia, hypertension, class I obesity, atrial fibrillation (on apixaban), coronary artery disease with report of 3 stents of LAD and 1 stent in diagonal who was admitted on 6/18/2024 with chest pain while on the train for about an hour and lower extremity swelling.   EKG without ST changes.  Initial troponin level 17.      Interim Events   :  - Personal Telemetry interpretation: Sinus bradycardia-sinus rhythm with rates 56-60  - Overnight events: No cardiac events.  Reports that he is chest pain-free.  Some soft blood pressure this morning requiring a 500 mL bolus  - Vitals: Intermittent soft blood pressure 88/50 recovered to 101/57, on room air  - Labs reviewed: Creatinine 0.94, potassium 5.2  - Weight: 192 pounds        Review of Systems   Constitutional:  Positive for malaise/fatigue. Negative for chills and fever.   Respiratory:  Negative for cough, sputum production, shortness of breath and wheezing.    Cardiovascular:  Positive for leg swelling (improved). Negative for chest pain, palpitations, orthopnea, claudication and PND.   Gastrointestinal:  Negative for abdominal pain, blood in stool, nausea and vomiting.   Musculoskeletal:  Positive for back pain.   Neurological:  Negative for dizziness, weakness and headaches.   Endo/Heme/Allergies:  Does not  "bruise/bleed easily.   Psychiatric/Behavioral: Negative.          Medical History  Past Medical History:   Diagnosis Date    CAD (coronary artery disease)     CAD (coronary artery disease) 8/24/2013    CHEST PAIN 8/24/2013    HLD (hyperlipidemia) 8/24/2013    HTN (hypertension) 8/24/2013    Hypercholesteremia     Hypertension     MI (myocardial infarction) (HCC)     Occluded coronary artery stent          No family history on file.      Social History     Tobacco Use    Smoking status: Every Day     Current packs/day: 0.50     Types: Cigarettes    Smokeless tobacco: Never   Vaping Use    Vaping status: Never Used   Substance Use Topics    Alcohol use: Not Currently     Comment: 2-3 week    Drug use: No         Allergies   Allergen Reactions    Lidocaine Hcl Anaphylaxis     \"All irvin.\"    Ketorolac Tromethamine Hives    Metoclopramide Anxiety     Claustrophobia.         Medications   Scheduled Medications   Medication Dose Frequency    hydrOXYzine HCl  25 mg TID    senna-docusate  2 Tablet Q EVENING    Pharmacy Consult Request  1 Each PHARMACY TO DOSE    [Held by provider] apixaban  5 mg BID    clopidogrel  75 mg QAM    isosorbide mononitrate SR  90 mg QAM    lisinopril  20 mg QAM    omeprazole  40 mg QAM    nicotine  21 mg Daily-0600    hydrALAZINE  10 mg Q8HRS    aspirin  81 mg DAILY    atorvastatin  40 mg QAM    ranolazine  500 mg BID    carvedilol  12.5 mg BID WITH MEALS    insulin regular  1-6 Units 4X/DAY ACHS           Physical Exam    Body mass index is 31.14 kg/m².     BP 92/54   Pulse (!) 59   Temp 36.4 °C (97.5 °F) (Temporal)   Resp 14   Ht 1.676 m (5' 6\")   Wt 87.5 kg (192 lb 14.4 oz)   SpO2 90%      Vitals:    06/20/24 0720 06/20/24 0723 06/20/24 1024 06/20/24 1025   BP: 101/57  103/55 92/54   Pulse:  (!) 55 (!) 59    Resp:   14    Temp:   36.4 °C (97.5 °F)    TempSrc:   Temporal    SpO2:   90%    Weight:       Height:            Oxygen Therapy:  Pulse Oximetry: 90 %, O2 (LPM): 0, O2 Delivery " Device: None - Room Air    BP Readings from Last 4 Encounters:   06/20/24 92/54   01/02/23 (!) 147/68   04/24/22 118/59   01/02/15 107/81       Wt Readings from Last 4 Encounters:   06/19/24 87.5 kg (192 lb 14.4 oz)   01/02/23 86.2 kg (190 lb)   04/24/22 85.6 kg (188 lb 11.4 oz)   01/01/15 99.8 kg (220 lb 1.6 oz)         Physical Exam  Vitals and nursing note reviewed.   Constitutional:       General: He is not in acute distress.     Appearance: He is obese.   HENT:      Head: Normocephalic and atraumatic.      Mouth/Throat:      Mouth: Mucous membranes are moist.   Eyes:      Pupils: Pupils are equal, round, and reactive to light.   Neck:      Vascular: No JVD.   Cardiovascular:      Rate and Rhythm: Regular rhythm. Bradycardia present.      Pulses: Normal pulses.      Heart sounds: Normal heart sounds. No murmur heard.     No friction rub. No gallop.   Pulmonary:      Effort: Pulmonary effort is normal.      Breath sounds: Normal breath sounds. No wheezing or rhonchi.   Abdominal:      General: Bowel sounds are normal. There is distension.   Musculoskeletal:         General: No swelling.      Right lower leg: Edema (trace) present.      Left lower leg: Edema (trace) present.   Skin:     General: Skin is warm and dry.   Neurological:      General: No focal deficit present.      Mental Status: He is alert and oriented to person, place, and time. Mental status is at baseline.   Psychiatric:         Mood and Affect: Mood and affect normal.         Behavior: Behavior normal.         Thought Content: Thought content normal.         Cognition and Memory: Memory normal.         Judgment: Judgment normal.               Labs (personally reviewed):     Estimated Creatinine Clearance: 79.1 mL/min (by C-G formula based on SCr of 0.94 mg/dL).    Lab Results   Component Value Date/Time    BNPBTYPENAT 7 12/21/2014 11:05 PM     Recent Labs     06/18/24  2302 06/20/24  0222   CREATININE 0.66 0.94   BUN 16 25*   POTASSIUM 4.0 5.2    SODIUM 138 135   CALCIUM 9.1 9.0   CO2 22 20   ALBUMIN 4.1  --      Recent Labs     06/18/24  2302 06/20/24  0222   GLUCOSE 121* 155*     Recent Labs     06/18/24  2302   ASTSGOT 16   ALTSGPT 20   ALKPHOSPHAT 111*     Recent Labs     06/18/24  2302 06/20/24  0222   WBC 7.8 9.7   HEMOGLOBIN 14.1 13.4*   PLATELETCT 279 296     Recent Labs     06/19/24  0333 06/19/24  0534 06/19/24  0948   TROPONINT 18 22* 20*     Lab Results   Component Value Date/Time    CHOLSTRLTOT 115 06/20/2024 02:22 AM    LDL 51 06/20/2024 02:22 AM    HDL 28 (A) 06/20/2024 02:22 AM    TRIGLYCERIDE 181 (H) 06/20/2024 02:22 AM       Imaging:  DX-CHEST-PORTABLE (1 VIEW)   Final Result         1.  No acute cardiopulmonary disease.      CL-LEFT HEART CATHETERIZATION WITH POSSIBLE INTERVENTION    (Results Pending)       Cardiac Imaging and Procedures Review        Echo 12/12/2023  Left Ventricle:   The left ventricle is mildly dilated. There is borderline concentric left   ventricular hypertrophy. The Ejection Fraction estimate is 60-65%. Left ventricular systolic   function is normal. No regional wall motion abnormalities noted. A variety of Doppler measurements   indicate normal left ventricular diastolic function.     Stress Test 11/16/2023  FINDINGS:   Left ventricular size is normal.  . The KIR=095 ml. Normals for end diastolic volume (EDV): males, <140 ml. , females <120 ml. -- these are approximate values and can vary significantly based on body size.     Perfusion scans are normal.  There is no evidence for ischemia or infarct.     There is normal wall motion.     The resting left ventricular ejection fraction measures 64%. Normal is greater than 46%.     LHC  -pending    Principal Problem:    Chest pain (POA: Yes)  Active Problems:    Primary hypertension (POA: Yes)    HLD (hyperlipidemia) (POA: Yes)    Malingerer (POA: Yes)    CAD (coronary artery disease) (POA: Yes)    Smoking (POA: Yes)    Type 2 diabetes mellitus with hyperglycemia,  without long-term current use of insulin (HCC) (POA: Yes)  Resolved Problems:    * No resolved hospital problems. *      Assessment and Medical Decision Making:    # Unstable angina  # Coronary artery disease   # report of prior LAD and Diag stents (failed either radial approaches in the past)  # On triple therapy longterm AC/AP agents   # HTN, DL, class 1 obesity, T2DM (controlled)  # pAF on Apixaban and beta blockers  # current smoker  # Last echocardiogram 12/2023 -LVEF 60-65%     Reportedly, chest pain while on the train. Patient had a workup at [facility]  which everything was negative.  Patient called EMS and he was brought to [facility] for further evaluation.    Currently denies chest pain, comfortable sitting up at the edge of the bed.   -Plan for left heart cath.  Discussed with patient and he insists on waiting full 48 hours after his last apixaban dose due to a concern of bleeding.  He wishes to delay the procedure until tomorrow morning 6/21/2024.  Last dose of Eliquis given 6/19/2024 in the morning.    -Keep n.p.o. at midnight 6/20/2024 until left heart cath. Reportedly had rotational atherectomy for prior ISR and they only had success with groin access    LVEF 60-65% per last echocardiogram in December 2023    -Continue holding Eliquis   -Continue aspirin 81 mg and Plavix 75 mg daily   -Continue high intensity statin-atorvastatin 40 mg for goal LDL less than 70-changed to nightly administration for better medication efficacy.  LDL 51-well-controlled.   -Continue carvedilol 12.5 mg twice daily   -Continue lisinopril 20 mg daily   -Continue isosorbide mononitrate SR 90 mg every morning   -Can discontinue Nitropaste, patient declines it and due to intermittent soft blood pressure   -Continue Ranexa 500 mg twice daily   -Tobacco cessation         No future appointments.       Please contact me with any questions.  Thank you for allowing us to participate in the care of   Walker.      Please see Dr. Knapp  attestation for further details and MDM.     I, FAHAD Peterson performed a portion of the service face-to-face with the same  patient on the same date of service INDEPENDENTLY OF Dr. Knapp FOR 15 MINUTES. preparing to see the patient, reviewing hospital notes and tests, obtaining history from the patient, performing a medically appropriate exam, counseling and educating the patient, ordering medications/tests/procedures/referrals as clinically indicated, and documenting information in the electronic medical record.    Please note this dictation was created using voice recognition software.  I have made every reasonable attempt to correct obvious errors, but there may be errors of grammar and possibly content that I did not discover before finalizing the note.    FAHAD Peterson  [facility]  [phone]

## 2024-06-20 NOTE — CARE PLAN
The patient is Stable - Low risk of patient condition declining or worsening    Shift Goals  Clinical Goals: NPO at 0000 for cath; pain management; vss  Patient Goals: talk to NAM    Progress made toward(s) clinical / shift goals:    Problem: Pain - Standard  Goal: Alleviation of pain or a reduction in pain to the patient’s comfort goal  Outcome: Progressing     Problem: Knowledge Deficit - Standard  Goal: Patient and family/care givers will demonstrate understanding of plan of care, disease process/condition, diagnostic tests and medications  Outcome: Progressing       Patient is not progressing towards the following goals:

## 2024-06-20 NOTE — CARE PLAN
The patient is Watcher - Medium risk of patient condition declining or worsening    Shift Goals  Clinical Goals: LHC, pain control, BP management  Patient Goals: retreive items, fell better  Family Goals: DANIS    Progress made toward(s) clinical / shift goals:  Patient understands hospitalization    Patient is not progressing towards the following goals:    Patient unable to tolerate pain medicine due to BP      Problem: Pain - Standard  Goal: Alleviation of pain or a reduction in pain to the patient’s comfort goal  Outcome: Not Progressing

## 2024-06-20 NOTE — PROGRESS NOTES
Patient transported to T7 with all belongings. Patient confirmed all belongings were present upon departure from T202, patient confirmed all belongings were present upon arrival to T728

## 2024-06-20 NOTE — ASSESSMENT & PLAN NOTE
Reports multiple history of MI with stents  No records of stents seen.    6/21/2024  Awaiting left heart catheterization    6/22/2024  Cardiology does not feel left heart catheterization indicated at this time    6/23/2024  Stress testing at outside hospital in May 2024 per records was negative.  [facility] stress testing today was negative.

## 2024-06-20 NOTE — HOSPITAL COURSE
"Mr. Lindsey \"JUAN\" Varinder Cardoso is a 68 y.o. male with past medical history of CAD, multiple MIs and stents placement a few years ago, A-fib on Eliquis, type 2 diabetes, hypertension, smoking, who presented 6/18/2024 with recurrent chest pain.       He states that chest pain started at 5 PM when he was on a train to [place] where he reportedly lives, and he had to be taken off the train and brought to the emergency department.  Since then the chest pain has been on and off at rest without elevating aggravating factors radiating to the left part of the chest.  Nitroglycerin is not working and he is asking for other medications for pain. Per chart review he was admitted to [facility] in May, had stress test done, offered coronary angiography and left AMA.  There was suspicions that he is seeking opiates.  Currently he rates his pain 7 out of 10. At this time he would agree for coronary angiography if offered. His initial evaluation with repeat troponin, EKG and chest x-ray are nonconcerning for acute MI.      On examination, patient reports that he has a stressful job and is always on the road.  Patient reports patient normally has chest pain with this current chest pain, this feels like one of his previous MI.  Patient endorses that his troponin level usually is negative along with cardiac stress test, however, with his previous MI in the past, this was diagnosed by doing an angiogram.     Patient also noted to have elevated blood pressure with SBP ranging from 150-170s.  Patient claims that he is compliant with all his medication.  Will trial patient on additional hydralazine.  Patient also reports that Nitropaste works better for his chest pain to control blood pressure along with the pain, however, we will hold off until we hear further from cardiology as we are hoping to pursue a NM stress test.    Cardiology Dr. Knapp  was consulted.  Per cardiology, we will pursue C to rule out ACS.  Also " continue to control patient's blood pressure.

## 2024-06-20 NOTE — PROGRESS NOTES
Pt arrived to T728. Report received from ROMÁN Raymond. Pt AOx4, VSS.     4 Eyes Skin Assessment Completed by ROMÁN Bunn and ROMÁN Navarro.    Head WDL  Ears WDL  Nose WDL  Mouth WDL  Neck WDL  Breast/Chest WDL  Shoulder Blades WDL  Spine WDL  (R) Arm/Elbow/Hand Bruising  (L) Arm/Elbow/Hand Bruising  Abdomen WDL  Groin WDL  Scrotum/Coccyx/Buttocks WDL  (R) Leg WDL  (L) Leg WDL  (R) Heel/Foot/Toe WDL  (L) Heel/Foot/Toe WDL          Devices In Places Tele Box, Blood Pressure Cuff, and Pulse Ox      Interventions In Place Pressure Redistribution Mattress    Possible Skin Injury No    Pictures Uploaded Into Epic No, needs to be completed  Wound Consult Placed N/A  RN Wound Prevention Protocol Ordered No          Pt states he does not want us to go through his belongings and document what is at bedside. Pt states that his wife is on the way and is going to take all of his belongings with her. suzette Navarro RN at bedside to verify.

## 2024-06-20 NOTE — PROGRESS NOTES
"Hospital Medicine Daily Progress Note    Date of Service  6/20/2024    Chief Complaint  César Cardoso is a 68 y.o. male admitted 6/18/2024 with chest pain    Hospital Course  Mr. Lindsey \"JUAN\" Varinder Cardoso is a 68 y.o. male with past medical history of CAD, multiple MIs and stents placement a few years ago, A-fib on Eliquis, type 2 diabetes, hypertension, smoking, who presented 6/18/2024 with recurrent chest pain.       He states that chest pain started at 5 PM when he was on a train to [place] where he reportedly lives, and he had to be taken off the train and brought to the emergency department.  Since then the chest pain has been on and off at rest without elevating aggravating factors radiating to the left part of the chest.  Nitroglycerin is not working and he is asking for other medications for pain. Per chart review he was admitted to [facility] in May, had stress test done, offered coronary angiography and left AMA.  There was suspicions that he is seeking opiates.  Currently he rates his pain 7 out of 10. At this time he would agree for coronary angiography if offered. His initial evaluation with repeat troponin, EKG and chest x-ray are nonconcerning for acute MI.      On examination, patient reports that he has a stressful job and is always on the road.  Patient reports patient normally has chest pain with this current chest pain, this feels like one of his previous MI.  Patient endorses that his troponin level usually is negative along with cardiac stress test, however, with his previous MI in the past, this was diagnosed by doing an angiogram.     Patient also noted to have elevated blood pressure with SBP ranging from 150-170s.  Patient claims that he is compliant with all his medication.  Will trial patient on additional hydralazine.  Patient also reports that Nitropaste works better for his chest pain to control blood pressure along with the pain, however, we will hold " off until we hear further from cardiology as we are hoping to pursue a NM stress test.    Cardiology Dr. Knapp  was consulted.  Per cardiology, we will pursue LHC to rule out ACS.  Also continue to control patient's blood pressure.        Interval Problem Update  -Patient seen and examined.  Patient reports improvement with chest pain.  Patient's blood pressure has been more controlled with addition of hydralazine along with Nitropaste.  Patient's blood pressure SBP ranging in 100-1 teens.  Per discussion with cardiology, patient is anticipated to have LHC on 2/21 due to Eliquis.  Plan of care: Continue telemetry monitoring; monitor patient's blood pressure; prepped patient for an LHC  Disposition: Patient anticipated to stay overnight until C and further recommendations from cardiology has been made  Lab work: Reviewed; expected  VSS at this time    I have discussed this patient's plan of care and discharge plan at IDT rounds today with Case Management, Nursing, Nursing leadership, and other members of the IDT team.    Consultants/Specialty  cardiology - Dr. Knapp    Code Status  Full Code    Disposition  The patient is not medically cleared for discharge to home or a post-acute facility.  Anticipate discharge to: home with close outpatient follow-up    I have placed the appropriate orders for post-discharge needs.    Review of Systems  Review of Systems   Constitutional:  Positive for malaise/fatigue. Negative for chills and fever.   HENT: Negative.     Eyes: Negative.    Respiratory:  Positive for cough.    Cardiovascular:  Positive for chest pain.   Gastrointestinal: Negative.    Genitourinary: Negative.    Musculoskeletal:  Positive for myalgias.   Skin: Negative.    Neurological:  Positive for weakness.   Endo/Heme/Allergies: Negative.    Psychiatric/Behavioral: Negative.          Physical Exam  Temp:  [36.2 °C (97.2 °F)-36.9 °C (98.5 °F)] 36.4 °C (97.5 °F)  Pulse:  [51-65] 59  Resp:  [14-20] 14  BP:  ()/(47-69) 101/57  SpO2:  [90 %-94 %] 90 %    Physical Exam  Vitals and nursing note reviewed.   HENT:      Head: Normocephalic.      Mouth/Throat:      Mouth: Mucous membranes are moist.      Pharynx: Oropharynx is clear.   Eyes:      Pupils: Pupils are equal, round, and reactive to light.   Cardiovascular:      Rate and Rhythm: Normal rate and regular rhythm.      Pulses: Normal pulses.      Heart sounds: Normal heart sounds.   Pulmonary:      Effort: Pulmonary effort is normal.      Breath sounds: Normal breath sounds.   Abdominal:      General: Bowel sounds are normal.      Palpations: Abdomen is soft.   Musculoskeletal:         General: Tenderness present.      Cervical back: Normal range of motion and neck supple.   Skin:     General: Skin is dry.      Capillary Refill: Capillary refill takes 2 to 3 seconds.   Neurological:      Mental Status: He is alert. Mental status is at baseline.      Motor: Weakness present.         Fluids  No intake or output data in the 24 hours ending 06/20/24 1025    Laboratory  Recent Labs     06/18/24 2302 06/20/24 0222   WBC 7.8 9.7   RBC 4.68* 4.44*   HEMOGLOBIN 14.1 13.4*   HEMATOCRIT 43.6 42.0   MCV 93.2 94.6   MCH 30.1 30.2   MCHC 32.3 31.9*   RDW 45.4 46.0   PLATELETCT 279 296   MPV 9.3 9.5     Recent Labs     06/18/24 2302 06/20/24 0222   SODIUM 138 135   POTASSIUM 4.0 5.2   CHLORIDE 104 105   CO2 22 20   GLUCOSE 121* 155*   BUN 16 25*   CREATININE 0.66 0.94   CALCIUM 9.1 9.0             Recent Labs     06/20/24  0222   TRIGLYCERIDE 181*   HDL 28*   LDL 51       Imaging  DX-CHEST-PORTABLE (1 VIEW)   Final Result         1.  No acute cardiopulmonary disease.      CL-LEFT HEART CATHETERIZATION WITH POSSIBLE INTERVENTION    (Results Pending)        Assessment/Plan  * Chest pain- (present on admission)  Assessment & Plan  68-year-old male with prior MIs and stents placement, presented with resting chest pain with no effect from nitroglycerin, concern for possible  unstable angina  Initial EKG and troponin does not show MI.  Plan: Observe on telemetry, repeat troponin  Consider cardiology consult for coronary angiography.  Will not order stress test since it was done recently and was normal  Will put nitroglycerin ointment.  IV morphine if pain remains uncontrolled  Consulted cardiology Dr. Knapp  -recommendations to pursue [facility]  Nitropaste also added    Type 2 diabetes mellitus with hyperglycemia, without long-term current use of insulin (HCC)- (present on admission)  Assessment & Plan  Will hold metformin  Observe blood sugars.  Currently resting blood sugar 121  Patient placed on SSI    Smoking- (present on admission)  Assessment & Plan  Patient educated and counseled in regard to smoking cessation  Nicotine replacement therapy added      CAD (coronary artery disease)- (present on admission)  Assessment & Plan  Reports multiple history of MI with stents  No records of stents seen.    Elyssaer- (present on admission)  Assessment & Plan  History of this as patient has had multiple admissions not only in our facility but also other facilities around the area    HLD (hyperlipidemia)- (present on admission)  Assessment & Plan  Continue home medications    Primary hypertension- (present on admission)  Assessment & Plan  Continue lisinopril, metoprolol  Monitor blood pressure  Added hydralazine 10 mg 3 times daily         VTE prophylaxis:   SCDs/TEDs   therapeutic anticoagulation with eliquis 2.5 mg BID      I have performed a physical exam and reviewed and updated ROS and Plan today (6/20/2024). In review of yesterday's note (6/19/2024), there are no changes except as documented above.      IJesu DNP performed a substantiated portion of the service face-to-face with same patient on the same date of service INDEPENDENTLY from the MD on assessment, examination, discussion plan of care FOR 21 MINUTES.  I was personally involved in reviewing and  conducting the medical decision making, including the information as described above.

## 2024-06-20 NOTE — PROGRESS NOTES
Pt verbalized wanting to speak to the NAM about missing patient belongings to RN who escalated to this charge RN. NAM notified, NAM stated she would talk to patient and was aware of events that happened during the day.

## 2024-06-21 ENCOUNTER — APPOINTMENT (OUTPATIENT)
Dept: CARDIOLOGY | Facility: MEDICAL CENTER | Age: 69
DRG: 392 | End: 2024-06-21
Attending: INTERNAL MEDICINE
Payer: MEDICARE

## 2024-06-21 LAB
ANION GAP SERPL CALC-SCNC: 12 MMOL/L (ref 7–16)
BUN SERPL-MCNC: 25 MG/DL (ref 8–22)
CALCIUM SERPL-MCNC: 8.7 MG/DL (ref 8.5–10.5)
CHLORIDE SERPL-SCNC: 107 MMOL/L (ref 96–112)
CO2 SERPL-SCNC: 21 MMOL/L (ref 20–33)
CREAT SERPL-MCNC: 1.12 MG/DL (ref 0.5–1.4)
ERYTHROCYTE [DISTWIDTH] IN BLOOD BY AUTOMATED COUNT: 47.2 FL (ref 35.9–50)
GFR SERPLBLD CREATININE-BSD FMLA CKD-EPI: 72 ML/MIN/1.73 M 2
GLUCOSE BLD STRIP.AUTO-MCNC: 102 MG/DL (ref 65–99)
GLUCOSE BLD STRIP.AUTO-MCNC: 112 MG/DL (ref 65–99)
GLUCOSE BLD STRIP.AUTO-MCNC: 176 MG/DL (ref 65–99)
GLUCOSE SERPL-MCNC: 134 MG/DL (ref 65–99)
HCT VFR BLD AUTO: 39 % (ref 42–52)
HGB BLD-MCNC: 12.1 G/DL (ref 14–18)
MCH RBC QN AUTO: 29.9 PG (ref 27–33)
MCHC RBC AUTO-ENTMCNC: 31 G/DL (ref 32.3–36.5)
MCV RBC AUTO: 96.3 FL (ref 81.4–97.8)
PLATELET # BLD AUTO: 228 K/UL (ref 164–446)
PMV BLD AUTO: 9.5 FL (ref 9–12.9)
POTASSIUM SERPL-SCNC: 4.8 MMOL/L (ref 3.6–5.5)
RBC # BLD AUTO: 4.05 M/UL (ref 4.7–6.1)
SODIUM SERPL-SCNC: 140 MMOL/L (ref 135–145)
WBC # BLD AUTO: 6.6 K/UL (ref 4.8–10.8)

## 2024-06-21 PROCEDURE — 85027 COMPLETE CBC AUTOMATED: CPT

## 2024-06-21 PROCEDURE — 82962 GLUCOSE BLOOD TEST: CPT

## 2024-06-21 PROCEDURE — A9270 NON-COVERED ITEM OR SERVICE: HCPCS | Performed by: NURSE PRACTITIONER

## 2024-06-21 PROCEDURE — 700102 HCHG RX REV CODE 250 W/ 637 OVERRIDE(OP): Performed by: INTERNAL MEDICINE

## 2024-06-21 PROCEDURE — 99232 SBSQ HOSP IP/OBS MODERATE 35: CPT | Performed by: STUDENT IN AN ORGANIZED HEALTH CARE EDUCATION/TRAINING PROGRAM

## 2024-06-21 PROCEDURE — A9270 NON-COVERED ITEM OR SERVICE: HCPCS

## 2024-06-21 PROCEDURE — 770020 HCHG ROOM/CARE - TELE (206)

## 2024-06-21 PROCEDURE — 700102 HCHG RX REV CODE 250 W/ 637 OVERRIDE(OP)

## 2024-06-21 PROCEDURE — A9270 NON-COVERED ITEM OR SERVICE: HCPCS | Performed by: INTERNAL MEDICINE

## 2024-06-21 PROCEDURE — 700111 HCHG RX REV CODE 636 W/ 250 OVERRIDE (IP): Performed by: INTERNAL MEDICINE

## 2024-06-21 PROCEDURE — 700102 HCHG RX REV CODE 250 W/ 637 OVERRIDE(OP): Performed by: NURSE PRACTITIONER

## 2024-06-21 PROCEDURE — 80048 BASIC METABOLIC PNL TOTAL CA: CPT

## 2024-06-21 PROCEDURE — 700111 HCHG RX REV CODE 636 W/ 250 OVERRIDE (IP)

## 2024-06-21 PROCEDURE — 36415 COLL VENOUS BLD VENIPUNCTURE: CPT

## 2024-06-21 RX ORDER — VERAPAMIL HYDROCHLORIDE 2.5 MG/ML
INJECTION, SOLUTION INTRAVENOUS
Status: COMPLETED
Start: 2024-06-21 | End: 2024-06-21

## 2024-06-21 RX ORDER — HEPARIN SODIUM 1000 [USP'U]/ML
INJECTION, SOLUTION INTRAVENOUS; SUBCUTANEOUS
Status: COMPLETED
Start: 2024-06-21 | End: 2024-06-21

## 2024-06-21 RX ORDER — HEPARIN SODIUM 200 [USP'U]/100ML
INJECTION, SOLUTION INTRAVENOUS
Status: COMPLETED
Start: 2024-06-21 | End: 2024-06-21

## 2024-06-21 RX ORDER — DIPHENHYDRAMINE HYDROCHLORIDE 50 MG/ML
INJECTION INTRAMUSCULAR; INTRAVENOUS
Status: COMPLETED
Start: 2024-06-21 | End: 2024-06-21

## 2024-06-21 RX ORDER — MIDAZOLAM HYDROCHLORIDE 1 MG/ML
INJECTION INTRAMUSCULAR; INTRAVENOUS
Status: COMPLETED
Start: 2024-06-21 | End: 2024-06-21

## 2024-06-21 RX ADMIN — MORPHINE SULFATE 2 MG: 4 INJECTION INTRAVENOUS at 18:41

## 2024-06-21 RX ADMIN — HYDRALAZINE HYDROCHLORIDE 10 MG: 10 TABLET, FILM COATED ORAL at 14:11

## 2024-06-21 RX ADMIN — CLOPIDOGREL BISULFATE 75 MG: 75 TABLET ORAL at 09:12

## 2024-06-21 RX ADMIN — SENNOSIDES AND DOCUSATE SODIUM 2 TABLET: 50; 8.6 TABLET ORAL at 17:16

## 2024-06-21 RX ADMIN — HYDROXYZINE HYDROCHLORIDE 25 MG: 25 TABLET ORAL at 17:16

## 2024-06-21 RX ADMIN — HYDRALAZINE HYDROCHLORIDE 10 MG: 10 TABLET, FILM COATED ORAL at 21:51

## 2024-06-21 RX ADMIN — ISOSORBIDE MONONITRATE 90 MG: 30 TABLET, EXTENDED RELEASE ORAL at 09:10

## 2024-06-21 RX ADMIN — APIXABAN 5 MG: 5 TABLET, FILM COATED ORAL at 17:16

## 2024-06-21 RX ADMIN — MORPHINE SULFATE 2 MG: 4 INJECTION INTRAVENOUS at 21:49

## 2024-06-21 RX ADMIN — ASPIRIN 81 MG: 81 TABLET, COATED ORAL at 09:12

## 2024-06-21 RX ADMIN — OXYCODONE HYDROCHLORIDE 5 MG: 5 TABLET ORAL at 20:36

## 2024-06-21 RX ADMIN — HYDROXYZINE HYDROCHLORIDE 25 MG: 25 TABLET ORAL at 11:50

## 2024-06-21 RX ADMIN — ATORVASTATIN CALCIUM 40 MG: 40 TABLET, FILM COATED ORAL at 17:16

## 2024-06-21 RX ADMIN — OXYCODONE HYDROCHLORIDE 5 MG: 5 TABLET ORAL at 09:21

## 2024-06-21 RX ADMIN — INSULIN HUMAN 1 UNITS: 100 INJECTION, SOLUTION PARENTERAL at 20:43

## 2024-06-21 RX ADMIN — OXYCODONE HYDROCHLORIDE 5 MG: 5 TABLET ORAL at 17:20

## 2024-06-21 RX ADMIN — HYDRALAZINE HYDROCHLORIDE 10 MG: 10 TABLET, FILM COATED ORAL at 09:12

## 2024-06-21 RX ADMIN — OXYCODONE HYDROCHLORIDE 5 MG: 5 TABLET ORAL at 23:53

## 2024-06-21 RX ADMIN — MORPHINE SULFATE 2 MG: 4 INJECTION INTRAVENOUS at 10:34

## 2024-06-21 RX ADMIN — LISINOPRIL 20 MG: 20 TABLET ORAL at 09:12

## 2024-06-21 ASSESSMENT — ENCOUNTER SYMPTOMS
WEAKNESS: 0
PND: 0
DIZZINESS: 0
CLAUDICATION: 0
PALPITATIONS: 0
BLOOD IN STOOL: 0
PSYCHIATRIC NEGATIVE: 1
ABDOMINAL PAIN: 0
BACK PAIN: 1
FEVER: 0
NAUSEA: 1
MYALGIAS: 0
SHORTNESS OF BREATH: 0
VOMITING: 0
WHEEZING: 0
BRUISES/BLEEDS EASILY: 0
ORTHOPNEA: 0
COUGH: 0
SPUTUM PRODUCTION: 0
HEADACHES: 0
CHILLS: 0
NAUSEA: 0

## 2024-06-21 ASSESSMENT — FIBROSIS 4 INDEX: FIB4 SCORE: 1.05

## 2024-06-21 ASSESSMENT — PAIN DESCRIPTION - PAIN TYPE: TYPE: ACUTE PAIN

## 2024-06-21 NOTE — PROGRESS NOTES
"Patient refusing all morning medications at this time. Patient educated on the importance of the medications. Patient understands teaching, states \"I will take them when I am ready\". Charge notified.   "

## 2024-06-21 NOTE — PROGRESS NOTES
Cardiology Follow-up Note    Name:   César Cardoso   YOB: 1955  Age:   68 y.o.  male   MRN:   [id number]        Attending Provider: Dr Jesu Keller*     Chief Complaint: Chest Pain (Pt reports squeezing CP in center chest radiating to L arm. Pt has hx of 4 MI with 4 stent placements. Pt states this feels similar. Pt was worked up at [facility] and everything was negative, pt called EMS and was brought here for further evaluation.)       Reason for cardiology consult: Chest pain    History of Present Illness  César Cardoso is 68 y.o. male with prior medical history significant for diabetes mellitus type 2, dyslipidemia, hypertension, class I obesity, atrial fibrillation (on apixaban), coronary artery disease with report of 3 stents of LAD and 1 stent in diagonal who was admitted on 6/18/2024 with chest pain while on the train for about an hour and lower extremity swelling.   EKG without ST changes.  Initial troponin level 17.      Interim Events 6/20/2024:  - Personal Telemetry interpretation: Sinus bradycardia-sinus rhythm with rates 56-60  - Overnight events: No cardiac events.  Reports that he is chest pain-free.  Some soft blood pressure this morning requiring a 500 mL bolus  - Vitals: Intermittent soft blood pressure 88/50 recovered to 101/57, on room air  - Labs reviewed: Creatinine 0.94, potassium 5.2  - Weight: 192 pounds    Interim Events 6/21/2024:  - Personal Telemetry interpretation: Sinus bradycardia-sinus rhythm with rates 49-65  - Overnight events: No cardiac events.  Patient states he still has some minor nausea and chest discomfort and did not feel like taking his medications this morning but is willing to try now.   Extensive education about the importance of medication compliance.  He has been n.p.o. since midnight awaiting left heart cath.  - Vitals: 158/74 this morning, on room air  - Labs reviewed: Serum creatinine 1.12        Review of Systems  "  Constitutional:  Positive for malaise/fatigue. Negative for chills and fever.   Respiratory:  Negative for cough, sputum production, shortness of breath and wheezing.    Cardiovascular:  Positive for chest pain (mild) and leg swelling (improved). Negative for palpitations, orthopnea, claudication and PND.   Gastrointestinal:  Positive for nausea. Negative for abdominal pain, blood in stool and vomiting.   Musculoskeletal:  Positive for back pain.   Neurological:  Negative for dizziness, weakness and headaches.   Endo/Heme/Allergies:  Does not bruise/bleed easily.   Psychiatric/Behavioral: Negative.          Medical History  Past Medical History:   Diagnosis Date    CAD (coronary artery disease)     CAD (coronary artery disease) 8/24/2013    CHEST PAIN 8/24/2013    HLD (hyperlipidemia) 8/24/2013    HTN (hypertension) 8/24/2013    Hypercholesteremia     Hypertension     MI (myocardial infarction) (HCC)     Occluded coronary artery stent          No family history on file.      Social History     Tobacco Use    Smoking status: Every Day     Current packs/day: 0.50     Types: Cigarettes    Smokeless tobacco: Never   Vaping Use    Vaping status: Never Used   Substance Use Topics    Alcohol use: Not Currently     Comment: 2-3 week    Drug use: No         Allergies   Allergen Reactions    Lidocaine Hcl Anaphylaxis     \"All irvin.\"    Ketorolac Tromethamine Hives    Metoclopramide Anxiety     Claustrophobia.         Medications   Scheduled Medications   Medication Dose Frequency    hydrOXYzine HCl  25 mg TID    atorvastatin  40 mg Q EVENING    senna-docusate  2 Tablet Q EVENING    Pharmacy Consult Request  1 Each PHARMACY TO DOSE    [Held by provider] apixaban  5 mg BID    clopidogrel  75 mg QAM    isosorbide mononitrate SR  90 mg QAM    lisinopril  20 mg QAM    omeprazole  40 mg QAM    nicotine  21 mg Daily-0600    hydrALAZINE  10 mg Q8HRS    aspirin  81 mg DAILY    ranolazine  500 mg BID    carvedilol  12.5 mg BID WITH " "MEALS    insulin regular  1-6 Units 4X/DAY ACHS           Physical Exam    Body mass index is 31.99 kg/m².     BP (!) 158/74   Pulse (!) 55   Temp 36.5 °C (97.7 °F) (Temporal)   Resp 20   Ht 1.676 m (5' 6\")   Wt 89.9 kg (198 lb 3.1 oz)   SpO2 91%      Vitals:    06/20/24 2324 06/21/24 0329 06/21/24 0447 06/21/24 0800   BP: 101/48 103/46  (!) 158/74   Pulse: 60 (!) 52  (!) 55   Resp: 16 18  20   Temp: 36.6 °C (97.9 °F) 36.5 °C (97.7 °F)     TempSrc: Temporal Temporal  Temporal   SpO2: 91%   91%   Weight:   89.9 kg (198 lb 3.1 oz)    Height:            Oxygen Therapy:  Pulse Oximetry: 91 %, O2 (LPM): 0, O2 Delivery Device: None - Room Air    BP Readings from Last 4 Encounters:   06/21/24 (!) 158/74   01/02/23 (!) 147/68   04/24/22 118/59   01/02/15 107/81       Wt Readings from Last 4 Encounters:   06/21/24 89.9 kg (198 lb 3.1 oz)   01/02/23 86.2 kg (190 lb)   04/24/22 85.6 kg (188 lb 11.4 oz)   01/01/15 99.8 kg (220 lb 1.6 oz)         Physical Exam  Vitals and nursing note reviewed.   Constitutional:       General: He is not in acute distress.     Appearance: He is obese.   HENT:      Head: Normocephalic and atraumatic.      Mouth/Throat:      Mouth: Mucous membranes are moist.   Eyes:      Pupils: Pupils are equal, round, and reactive to light.   Neck:      Vascular: No JVD.   Cardiovascular:      Rate and Rhythm: Regular rhythm. Bradycardia present.      Pulses: Normal pulses.      Heart sounds: Normal heart sounds. No murmur heard.     No friction rub. No gallop.   Pulmonary:      Effort: Pulmonary effort is normal.      Breath sounds: Normal breath sounds. No wheezing or rhonchi.   Abdominal:      General: Bowel sounds are normal. There is distension.   Musculoskeletal:         General: No swelling.      Right lower leg: Edema (trace) present.      Left lower leg: Edema (trace) present.   Skin:     General: Skin is warm and dry.   Neurological:      General: No focal deficit present.      Mental Status: He " is alert and oriented to person, place, and time. Mental status is at baseline.   Psychiatric:         Mood and Affect: Mood and affect normal.         Behavior: Behavior normal.         Thought Content: Thought content normal.         Cognition and Memory: Memory normal.         Judgment: Judgment normal.               Labs (personally reviewed):     Estimated Creatinine Clearance: 67.2 mL/min (by C-G formula based on SCr of 1.12 mg/dL).    Lab Results   Component Value Date/Time    BNPBTYPENAT 7 12/21/2014 11:05 PM     Recent Labs     06/18/24 2302 06/20/24 0222 06/21/24  0217   CREATININE 0.66 0.94 1.12   BUN 16 25* 25*   POTASSIUM 4.0 5.2 4.8   SODIUM 138 135 140   CALCIUM 9.1 9.0 8.7   CO2 22 20 21   ALBUMIN 4.1  --   --      Recent Labs     06/18/24 2302 06/20/24 0222 06/21/24  0217   GLUCOSE 121* 155* 134*     Recent Labs     06/18/24 2302   ASTSGOT 16   ALTSGPT 20   ALKPHOSPHAT 111*     Recent Labs     06/18/24 2302 06/20/24 0222 06/21/24  0217   WBC 7.8 9.7 6.6   HEMOGLOBIN 14.1 13.4* 12.1*   PLATELETCT 279 296 228     Recent Labs     06/19/24  0333 06/19/24  0534 06/19/24  0948   TROPONINT 18 22* 20*     Lab Results   Component Value Date/Time    CHOLSTRLTOT 115 06/20/2024 02:22 AM    LDL 51 06/20/2024 02:22 AM    HDL 28 (A) 06/20/2024 02:22 AM    TRIGLYCERIDE 181 (H) 06/20/2024 02:22 AM       Imaging:  DX-CHEST-PORTABLE (1 VIEW)   Final Result         1.  No acute cardiopulmonary disease.      CL-LEFT HEART CATHETERIZATION WITH POSSIBLE INTERVENTION    (Results Pending)   CL-LEFT HEART CATHETERIZATION WITH POSSIBLE INTERVENTION    (Results Pending)       Cardiac Imaging and Procedures Review        Echo 12/12/2023  Left Ventricle:   The left ventricle is mildly dilated. There is borderline concentric left   ventricular hypertrophy. The Ejection Fraction estimate is 60-65%. Left ventricular systolic   function is normal. No regional wall motion abnormalities noted. A variety of Doppler measurements    indicate normal left ventricular diastolic function.     Stress Test 11/16/2023  FINDINGS:   Left ventricular size is normal.  . The YMS=637 ml. Normals for end diastolic volume (EDV): males, <140 ml. , females <120 ml. -- these are approximate values and can vary significantly based on body size.     Perfusion scans are normal.  There is no evidence for ischemia or infarct.     There is normal wall motion.     The resting left ventricular ejection fraction measures 64%. Normal is greater than 46%.     LHC  -pending    Principal Problem:    Chest pain (POA: Yes)  Active Problems:    Primary hypertension (POA: Yes)    HLD (hyperlipidemia) (POA: Yes)    Malingerer (POA: Yes)    CAD (coronary artery disease) (POA: Yes)    Smoking (POA: Yes)    Type 2 diabetes mellitus with hyperglycemia, without long-term current use of insulin (HCC) (POA: Yes)    Unstable angina (HCC) (POA: Yes)  Resolved Problems:    * No resolved hospital problems. *      Assessment and Medical Decision Making:    # Unstable angina  # Coronary artery disease   # report of prior LAD and Diag stents (failed either radial approaches in the past)  # On triple therapy longterm AC/AP agents   # HTN, DL, class 1 obesity, T2DM (controlled)  # pAF on Apixaban and beta blockers  # current smoker  # Last echocardiogram 12/2023 -LVEF 60-65%     Reportedly, chest pain while on the train. Patient had a workup at [facility]  which everything was negative.  Patient called EMS and he was brought to [facility] for further evaluation.    -Plan for left heart cath today after full 48 hours of stopping Eliquis per patient request.   Last dose of Eliquis given 6/19/2024 in the morning.    -Keep n.p.o.  until left heart cath. Reportedly had rotational atherectomy for prior ISR and they only had success with groin access    LVEF 60-65% per last echocardiogram in December 2023    -Continue holding Eliquis   -Continue aspirin 81 mg and Plavix  75 mg daily   -Continue high intensity statin-atorvastatin 40 mg for goal LDL less than 70-changed to nightly administration for better medication efficacy.  LDL 51-well-controlled.   -Continue carvedilol 12.5 mg twice daily   -Continue lisinopril 20 mg daily   -Continue isosorbide mononitrate SR 90 mg every morning   -Continue Ranexa 500 mg twice daily   -Tobacco cessation         No future appointments.       Please contact me with any questions.  Thank you for allowing us to participate in the care of Mr. Cardoso.      Please see Dr. Knapp  attestation for further details and MDM.     I, FAHAD Peterson performed a portion of the service face-to-face with the same  patient on the same date of service INDEPENDENTLY OF Dr. Knapp FOR 15 MINUTES. preparing to see the patient, reviewing hospital notes and tests, obtaining history from the patient, performing a medically appropriate exam, counseling and educating the patient, ordering medications/tests/procedures/referrals as clinically indicated, and documenting information in the electronic medical record.    Please note this dictation was created using voice recognition software.  I have made every reasonable attempt to correct obvious errors, but there may be errors of grammar and possibly content that I did not discover before finalizing the note.    FRANSISCA Peterson.  [facility]  [phone]

## 2024-06-21 NOTE — PROGRESS NOTES
Monitor Summary   SB W/ BBB: 49-54  Ectopy: none  15/.11/.42

## 2024-06-21 NOTE — PROGRESS NOTES
Patient has multiple bags at bedside, this RN offered to store belongings in safe keeping with security so they don't get lost or damaged. Patient is declining at this time and wishes to keep all belongings at bedside until the morning when he goes for his procedure and then wishes to give belongings to safe keeping. Will notify day nurse of patient's wishes

## 2024-06-21 NOTE — DOCUMENTATION QUERY
[facility]                                                                       Query Response Note      PATIENT:               SHANTELL HARVEY  ACCT #:                  [id number]  MRN:                     [id number]  :                      1955  ADMIT DATE:       2024 11:42 PM  DISCH DATE:          RESPONDING  PROVIDER #:        [id number]           QUERY TEXT:    Can the diagnosis of heart failure be further clarified based on the clinical indicators and treatment?    The patient's Clinical Indicators include:  Findings:  ED:  PMH of CAD, AFib, CHF, diabetes, hypertension,   Moderately reduced counts in the mid to basal inferior wall, appears worse on rest, probably soft tissue attenuation  artifact.  In addition there was left ventricular wall diffuse hypokinesis.  Normal left ventricular size and mildly reduced LVEF of 49 %.     HP: prior to admission medications furosemide 20mg  tab two times  a day      PN: Echo 2023  Left Ventricle:   The left ventricle is mildly dilated. There is borderline concentric left  ventricular hypertrophy. The Ejection Fraction estimate is 60-65%. Left ventricular systolic  function is normal. No regional wall motion abnormalities  noted. A variety of Doppler measurements  indicate normal left ventricular diastolic function.    Treatment: MAR lisinopril 20mg, carvedilol 12.5 two times a day    Risk Factors: history of hypertension  ED  :history of CHF  Options provided:   -- Chronic Systolic Congestive Heart Failure   -- Chronic Diastolic Congestive Heart Failure   -- Other explanation, (please specify other explanation)      Query created by: Liz Castillo on 2024 5:44 AM    RESPONSE TEXT:    Chronic Systolic Congestive Heart Failure          Electronically signed by:  ANDREA CHISHOLM 2024 6:37 AM

## 2024-06-21 NOTE — PROGRESS NOTES
Brief cardiology update:    I was notified by staff that the patient is declining to go to Cath Lab.  I discussed with the patient at bedside.  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  He was waiting for his wife to supervise the belongings while he goes to Cath Lab, however, he cannot get a hold of his wife.  Therefore, he refuses to go to Cath Lab today.  Patient was offered security services to lock down his belongings and he refused that too.    I extensively educated over multiple times on the importance of undergoing the coronary angiogram to further assess his coronary anatomy.  Patient is agreeable at first but ends up refusing it the day of the procedure, twice in 2 days.    Upon chart review, it appears that this is the patient's 6th ER visit since March 2024 and different states and hospitals.  Patient has history of malingering with frequently leaving AMA.    At this time, due to patient's ongoing refusal for coronary angiogram we are unable to further workup patient's ongoing complaints of chest pain.  Recommend continuing guideline directed medical therapy for patient's known coronary artery disease.    Cardiology will sign off

## 2024-06-21 NOTE — CARE PLAN
Problem: Pain - Standard  Goal: Alleviation of pain or a reduction in pain to the patient’s comfort goal  Outcome: Progressing     Problem: Knowledge Deficit - Standard  Goal: Patient and family/care givers will demonstrate understanding of plan of care, disease process/condition, diagnostic tests and medications  Outcome: Progressing   The patient is Stable - Low risk of patient condition declining or worsening    Shift Goals  Clinical Goals: heart cath  Patient Goals: comfort  Family Goals: rest    Progress made toward(s) clinical / shift goals:      Patient is not progressing towards the following goals:

## 2024-06-22 PROBLEM — K92.1 MELENA: Status: ACTIVE | Noted: 2024-06-22

## 2024-06-22 LAB
GLUCOSE BLD STRIP.AUTO-MCNC: 121 MG/DL (ref 65–99)
GLUCOSE BLD STRIP.AUTO-MCNC: 135 MG/DL (ref 65–99)
GLUCOSE BLD STRIP.AUTO-MCNC: 159 MG/DL (ref 65–99)
GLUCOSE BLD STRIP.AUTO-MCNC: 163 MG/DL (ref 65–99)
HCT VFR BLD AUTO: 41.5 % (ref 42–52)
HGB BLD-MCNC: 13.4 G/DL (ref 14–18)

## 2024-06-22 PROCEDURE — 770020 HCHG ROOM/CARE - TELE (206)

## 2024-06-22 PROCEDURE — 700111 HCHG RX REV CODE 636 W/ 250 OVERRIDE (IP): Performed by: INTERNAL MEDICINE

## 2024-06-22 PROCEDURE — 36415 COLL VENOUS BLD VENIPUNCTURE: CPT

## 2024-06-22 PROCEDURE — A9270 NON-COVERED ITEM OR SERVICE: HCPCS | Performed by: INTERNAL MEDICINE

## 2024-06-22 PROCEDURE — A9270 NON-COVERED ITEM OR SERVICE: HCPCS

## 2024-06-22 PROCEDURE — A9270 NON-COVERED ITEM OR SERVICE: HCPCS | Performed by: NURSE PRACTITIONER

## 2024-06-22 PROCEDURE — 700102 HCHG RX REV CODE 250 W/ 637 OVERRIDE(OP)

## 2024-06-22 PROCEDURE — A9270 NON-COVERED ITEM OR SERVICE: HCPCS | Performed by: STUDENT IN AN ORGANIZED HEALTH CARE EDUCATION/TRAINING PROGRAM

## 2024-06-22 PROCEDURE — 700102 HCHG RX REV CODE 250 W/ 637 OVERRIDE(OP): Performed by: NURSE PRACTITIONER

## 2024-06-22 PROCEDURE — 700102 HCHG RX REV CODE 250 W/ 637 OVERRIDE(OP): Performed by: INTERNAL MEDICINE

## 2024-06-22 PROCEDURE — 85014 HEMATOCRIT: CPT

## 2024-06-22 PROCEDURE — 82962 GLUCOSE BLOOD TEST: CPT | Mod: 91

## 2024-06-22 PROCEDURE — 700102 HCHG RX REV CODE 250 W/ 637 OVERRIDE(OP): Performed by: STUDENT IN AN ORGANIZED HEALTH CARE EDUCATION/TRAINING PROGRAM

## 2024-06-22 PROCEDURE — 93005 ELECTROCARDIOGRAM TRACING: CPT

## 2024-06-22 PROCEDURE — 99233 SBSQ HOSP IP/OBS HIGH 50: CPT | Performed by: STUDENT IN AN ORGANIZED HEALTH CARE EDUCATION/TRAINING PROGRAM

## 2024-06-22 PROCEDURE — 85018 HEMOGLOBIN: CPT

## 2024-06-22 RX ORDER — POLYETHYLENE GLYCOL 3350 17 G/17G
1 POWDER, FOR SOLUTION ORAL 2 TIMES DAILY
Status: DISCONTINUED | OUTPATIENT
Start: 2024-06-22 | End: 2024-06-24 | Stop reason: HOSPADM

## 2024-06-22 RX ORDER — SUCRALFATE ORAL 1 G/10ML
1 SUSPENSION ORAL EVERY 6 HOURS
Status: DISCONTINUED | OUTPATIENT
Start: 2024-06-22 | End: 2024-06-24 | Stop reason: HOSPADM

## 2024-06-22 RX ORDER — ALUMINA, MAGNESIA, AND SIMETHICONE 2400; 2400; 240 MG/30ML; MG/30ML; MG/30ML
10 SUSPENSION ORAL 4 TIMES DAILY PRN
Status: DISCONTINUED | OUTPATIENT
Start: 2024-06-22 | End: 2024-06-24 | Stop reason: HOSPADM

## 2024-06-22 RX ORDER — DOCUSATE SODIUM 100 MG/1
100 CAPSULE, LIQUID FILLED ORAL 2 TIMES DAILY
Status: DISCONTINUED | OUTPATIENT
Start: 2024-06-22 | End: 2024-06-24 | Stop reason: HOSPADM

## 2024-06-22 RX ADMIN — OXYCODONE HYDROCHLORIDE 5 MG: 5 TABLET ORAL at 21:13

## 2024-06-22 RX ADMIN — POLYETHYLENE GLYCOL 3350 1 PACKET: 17 POWDER, FOR SOLUTION ORAL at 18:06

## 2024-06-22 RX ADMIN — INSULIN HUMAN 1 UNITS: 100 INJECTION, SOLUTION PARENTERAL at 21:10

## 2024-06-22 RX ADMIN — HYDROXYZINE HYDROCHLORIDE 25 MG: 25 TABLET ORAL at 13:32

## 2024-06-22 RX ADMIN — CARVEDILOL 12.5 MG: 12.5 TABLET, FILM COATED ORAL at 18:02

## 2024-06-22 RX ADMIN — DOCUSATE SODIUM 100 MG: 100 CAPSULE, LIQUID FILLED ORAL at 10:34

## 2024-06-22 RX ADMIN — MORPHINE SULFATE 2 MG: 4 INJECTION INTRAVENOUS at 14:32

## 2024-06-22 RX ADMIN — RANOLAZINE 500 MG: 500 TABLET, EXTENDED RELEASE ORAL at 05:00

## 2024-06-22 RX ADMIN — LISINOPRIL 20 MG: 20 TABLET ORAL at 05:01

## 2024-06-22 RX ADMIN — HYDROXYZINE HYDROCHLORIDE 25 MG: 25 TABLET ORAL at 05:01

## 2024-06-22 RX ADMIN — OXYCODONE HYDROCHLORIDE 5 MG: 5 TABLET ORAL at 17:07

## 2024-06-22 RX ADMIN — OXYCODONE HYDROCHLORIDE 5 MG: 5 TABLET ORAL at 04:57

## 2024-06-22 RX ADMIN — MORPHINE SULFATE 2 MG: 4 INJECTION INTRAVENOUS at 06:07

## 2024-06-22 RX ADMIN — APIXABAN 5 MG: 5 TABLET, FILM COATED ORAL at 18:02

## 2024-06-22 RX ADMIN — APIXABAN 5 MG: 5 TABLET, FILM COATED ORAL at 05:00

## 2024-06-22 RX ADMIN — SENNOSIDES AND DOCUSATE SODIUM 2 TABLET: 50; 8.6 TABLET ORAL at 18:03

## 2024-06-22 RX ADMIN — MORPHINE SULFATE 2 MG: 4 INJECTION INTRAVENOUS at 01:08

## 2024-06-22 RX ADMIN — RANOLAZINE 500 MG: 500 TABLET, EXTENDED RELEASE ORAL at 18:02

## 2024-06-22 RX ADMIN — ATORVASTATIN CALCIUM 40 MG: 40 TABLET, FILM COATED ORAL at 18:02

## 2024-06-22 RX ADMIN — HYDROXYZINE HYDROCHLORIDE 25 MG: 25 TABLET ORAL at 18:03

## 2024-06-22 RX ADMIN — CLOPIDOGREL BISULFATE 75 MG: 75 TABLET ORAL at 05:00

## 2024-06-22 RX ADMIN — OXYCODONE HYDROCHLORIDE 5 MG: 5 TABLET ORAL at 09:23

## 2024-06-22 RX ADMIN — OXYCODONE HYDROCHLORIDE 5 MG: 5 TABLET ORAL at 13:32

## 2024-06-22 RX ADMIN — SUCRALFATE ORAL 1 G: 1 SUSPENSION ORAL at 10:34

## 2024-06-22 RX ADMIN — MORPHINE SULFATE 2 MG: 4 INJECTION INTRAVENOUS at 18:03

## 2024-06-22 RX ADMIN — HYDRALAZINE HYDROCHLORIDE 10 MG: 10 TABLET, FILM COATED ORAL at 13:24

## 2024-06-22 RX ADMIN — NICOTINE TRANSDERMAL SYSTEM 21 MG: 21 PATCH, EXTENDED RELEASE TRANSDERMAL at 05:00

## 2024-06-22 RX ADMIN — MORPHINE SULFATE 2 MG: 4 INJECTION INTRAVENOUS at 22:16

## 2024-06-22 RX ADMIN — SUCRALFATE ORAL 1 G: 1 SUSPENSION ORAL at 18:03

## 2024-06-22 RX ADMIN — ASPIRIN 81 MG: 81 TABLET, COATED ORAL at 05:01

## 2024-06-22 RX ADMIN — DOCUSATE SODIUM 100 MG: 100 CAPSULE, LIQUID FILLED ORAL at 18:06

## 2024-06-22 RX ADMIN — OMEPRAZOLE 40 MG: 20 CAPSULE, DELAYED RELEASE ORAL at 05:00

## 2024-06-22 RX ADMIN — ISOSORBIDE MONONITRATE 90 MG: 30 TABLET, EXTENDED RELEASE ORAL at 05:00

## 2024-06-22 RX ADMIN — MORPHINE SULFATE 2 MG: 4 INJECTION INTRAVENOUS at 10:34

## 2024-06-22 RX ADMIN — HYDRALAZINE HYDROCHLORIDE 10 MG: 10 TABLET, FILM COATED ORAL at 21:13

## 2024-06-22 RX ADMIN — INSULIN HUMAN 1 UNITS: 100 INJECTION, SOLUTION PARENTERAL at 13:20

## 2024-06-22 ASSESSMENT — PAIN DESCRIPTION - PAIN TYPE
TYPE: ACUTE PAIN

## 2024-06-22 ASSESSMENT — ENCOUNTER SYMPTOMS
SHORTNESS OF BREATH: 0
VOMITING: 0
NAUSEA: 0
COUGH: 0
CHILLS: 0
ABDOMINAL PAIN: 0
HEADACHES: 0
BLOOD IN STOOL: 1
FEVER: 0
DIZZINESS: 0
PALPITATIONS: 0
MYALGIAS: 0

## 2024-06-22 ASSESSMENT — COGNITIVE AND FUNCTIONAL STATUS - GENERAL
SUGGESTED CMS G CODE MODIFIER MOBILITY: CH
DAILY ACTIVITIY SCORE: 24
SUGGESTED CMS G CODE MODIFIER DAILY ACTIVITY: CH
MOBILITY SCORE: 24

## 2024-06-22 ASSESSMENT — FIBROSIS 4 INDEX: FIB4 SCORE: 1.05

## 2024-06-22 NOTE — PROGRESS NOTES
"Hospital Medicine Daily Progress Note    Date of Service  6/21/2024    Chief Complaint  César Cardoso is a 68 y.o. male admitted 6/18/2024 with chest pain    Hospital Course  Mr. Lindsey \"JUAN\" Varinder Cardoso is a 68 y.o. male with past medical history of CAD, multiple MIs and stents placement a few years ago, A-fib on Eliquis, type 2 diabetes, hypertension, smoking, who presented 6/18/2024 with recurrent chest pain.       He states that chest pain started at 5 PM when he was on a train to [place] where he reportedly lives, and he had to be taken off the train and brought to the emergency department.  Since then the chest pain has been on and off at rest without elevating aggravating factors radiating to the left part of the chest.  Nitroglycerin is not working and he is asking for other medications for pain. Per chart review he was admitted to [facility] in May, had stress test done, offered coronary angiography and left AMA.  There was suspicions that he is seeking opiates.  Currently he rates his pain 7 out of 10. At this time he would agree for coronary angiography if offered. His initial evaluation with repeat troponin, EKG and chest x-ray are nonconcerning for acute MI.      On examination, patient reports that he has a stressful job and is always on the road.  Patient reports patient normally has chest pain with this current chest pain, this feels like one of his previous MI.  Patient endorses that his troponin level usually is negative along with cardiac stress test, however, with his previous MI in the past, this was diagnosed by doing an angiogram.     Patient also noted to have elevated blood pressure with SBP ranging from 150-170s.  Patient claims that he is compliant with all his medication.  Will trial patient on additional hydralazine.  Patient also reports that Nitropaste works better for his chest pain to control blood pressure along with the pain, however, we will hold " off until we hear further from cardiology as we are hoping to pursue a NM stress test.    Cardiology Dr. Knapp  was consulted.  Per cardiology, we will pursue LHC to rule out ACS.  Also continue to control patient's blood pressure.        Interval Problem Update  -Patient seen and examined.  Patient reports improvement with chest pain.  Patient's blood pressure has been more controlled with addition of hydralazine along with Nitropaste.  Patient's blood pressure SBP ranging in 100-1 teens.  Per discussion with cardiology, patient is anticipated to have LHC on 2/21 due to Eliquis.  Plan of care: Continue telemetry monitoring; monitor patient's blood pressure; prepped patient for an LHC  Disposition: Patient anticipated to stay overnight until [facility] and further recommendations from cardiology has been made  Lab work: Reviewed; expected  VSS at this time    Above per previous hospitalist.    6/21/2024  Patient was seen and examined on the telemetry floor.  He continues on continuous cardiac monitoring.  Patient is awaiting left heart catheterization.  Continues to have chest pain intermittently, for which she has been taking oxycodone.      I have discussed this patient's plan of care and discharge plan at IDT rounds today with Case Management, Nursing, Nursing leadership, and other members of the IDT team.    Consultants/Specialty  cardiology - Dr. Knapp    Code Status  Full Code    Disposition  The patient is not medically cleared for discharge to home or a post-acute facility.  Anticipate discharge to: home with close outpatient follow-up    I have placed the appropriate orders for post-discharge needs.    Review of Systems  Review of Systems   Constitutional:  Negative for chills and fever.   Respiratory:  Negative for cough and shortness of breath.    Cardiovascular:  Positive for chest pain. Negative for palpitations.   Gastrointestinal:  Negative for abdominal pain, nausea and vomiting.   Musculoskeletal:   Negative for joint pain and myalgias.   Neurological:  Negative for dizziness and headaches.        Physical Exam  Temp:  [36.5 °C (97.7 °F)-36.7 °C (98.1 °F)] 36.7 °C (98.1 °F)  Pulse:  [50-62] 62  Resp:  [14-20] 18  BP: (101-158)/(46-74) 115/57  SpO2:  [91 %-93 %] 93 %    Physical Exam  Vitals and nursing note reviewed.   Constitutional:       General: He is not in acute distress.     Appearance: Normal appearance. He is not ill-appearing.   HENT:      Head: Normocephalic and atraumatic.      Mouth/Throat:      Mouth: Mucous membranes are moist.      Pharynx: Oropharynx is clear. No oropharyngeal exudate.   Eyes:      General: No scleral icterus.        Right eye: No discharge.         Left eye: No discharge.      Conjunctiva/sclera: Conjunctivae normal.   Cardiovascular:      Rate and Rhythm: Normal rate and regular rhythm.      Pulses: Normal pulses.      Heart sounds: Normal heart sounds. No murmur heard.  Pulmonary:      Effort: Pulmonary effort is normal. No respiratory distress.      Breath sounds: Normal breath sounds.   Abdominal:      General: Abdomen is flat. Bowel sounds are normal. There is no distension.      Palpations: Abdomen is soft.   Musculoskeletal:         General: No swelling.      Cervical back: Neck supple. No tenderness.      Right lower leg: No edema.      Left lower leg: No edema.   Skin:     General: Skin is warm and dry.      Coloration: Skin is not pale.   Neurological:      Mental Status: He is alert and oriented to person, place, and time. Mental status is at baseline.      Motor: No weakness.   Psychiatric:         Thought Content: Thought content normal.         Judgment: Judgment normal.         Fluids  No intake or output data in the 24 hours ending 06/21/24 2000    Laboratory  Recent Labs     06/18/24  2302 06/20/24  0222 06/21/24  0217   WBC 7.8 9.7 6.6   RBC 4.68* 4.44* 4.05*   HEMOGLOBIN 14.1 13.4* 12.1*   HEMATOCRIT 43.6 42.0 39.0*   MCV 93.2 94.6 96.3   MCH 30.1 30.2 29.9    MCHC 32.3 31.9* 31.0*   RDW 45.4 46.0 47.2   PLATELETCT 279 296 228   MPV 9.3 9.5 9.5     Recent Labs     06/18/24  2302 06/20/24  0222 06/21/24  0217   SODIUM 138 135 140   POTASSIUM 4.0 5.2 4.8   CHLORIDE 104 105 107   CO2 22 20 21   GLUCOSE 121* 155* 134*   BUN 16 25* 25*   CREATININE 0.66 0.94 1.12   CALCIUM 9.1 9.0 8.7             Recent Labs     06/20/24  0222   TRIGLYCERIDE 181*   HDL 28*   LDL 51       Imaging  DX-CHEST-PORTABLE (1 VIEW)   Final Result         1.  No acute cardiopulmonary disease.      CL-LEFT HEART CATHETERIZATION WITH POSSIBLE INTERVENTION    (Results Pending)        Assessment/Plan  * Chest pain- (present on admission)  Assessment & Plan  68-year-old male with prior MIs and stents placement, presented with resting chest pain with no effect from nitroglycerin, concern for possible unstable angina  Initial EKG and troponin does not show MI.  Plan: Observe on telemetry, repeat troponin  Consider cardiology consult for coronary angiography.  Will not order stress test since it was done recently and was normal  Will put nitroglycerin ointment.  IV morphine if pain remains uncontrolled  Consulted cardiology Dr. Knapp  -recommendations to pursue [facility]  Nitropaste also added    6/21/2024  Await left heart catheterization.  Continue oxycodone as needed for pain control.  Continuous cardiac monitoring.    Type 2 diabetes mellitus with hyperglycemia, without long-term current use of insulin (HCC)- (present on admission)  Assessment & Plan  Will hold metformin  Observe blood sugars.  Currently resting blood sugar 121  Patient placed on SSI    6/21/2024  Blood glucose controlled  No insulin coverage needed    Smoking- (present on admission)  Assessment & Plan  Patient educated and counseled in regard to smoking cessation  Nicotine replacement therapy added      CAD (coronary artery disease)- (present on admission)  Assessment & Plan  Reports multiple history of MI with stents  No records of stents  seen.    6/21/2024  Awaiting left heart catheterization    Malingerer- (present on admission)  Assessment & Plan  History of this as patient has had multiple admissions not only in our facility but also other facilities around the area    HLD (hyperlipidemia)- (present on admission)  Assessment & Plan  Continue home medications    Primary hypertension- (present on admission)  Assessment & Plan  Continue lisinopril, metoprolol  Monitor blood pressure  Added hydralazine 10 mg 3 times daily         VTE prophylaxis:    therapeutic anticoagulation with eliquis 5 mg BID      I have performed a physical exam and reviewed and updated ROS and Plan today (6/21/2024). In review of yesterday's note (6/20/2024), there are no changes except as documented above.

## 2024-06-22 NOTE — CARE PLAN
The patient is Stable - Low risk of patient condition declining or worsening    Shift Goals  Clinical Goals: Monitor chest pain and vital signs  Patient Goals: comfort, speak to MD  Family Goals: arun    Progress made toward(s) clinical / shift goals:      Patient is not progressing towards the following goals:    Problem: Pain - Standard  Goal: Alleviation of pain or a reduction in pain to the patient’s comfort goal  Outcome: Progressing     Problem: Knowledge Deficit - Standard  Goal: Patient and family/care givers will demonstrate understanding of plan of care, disease process/condition, diagnostic tests and medications  Outcome: Progressing     Problem: Psychosocial  Goal: Patient's level of anxiety will decrease  Outcome: Progressing  Goal: Patient's ability to verbalize feelings about condition will improve  Outcome: Progressing  Goal: Patient's ability to re-evaluate and adapt role responsibilities will improve  Outcome: Progressing  Goal: Patient and family will demonstrate ability to cope with life altering diagnosis and/or procedure  Outcome: Progressing  Goal: Spiritual and cultural needs incorporated into hospitalization  Outcome: Progressing     Problem: Communication  Goal: The ability to communicate needs accurately and effectively will improve  Outcome: Progressing     Problem: Discharge Barriers/Planning  Goal: Patient's continuum of care needs are met  Outcome: Progressing     Problem: Hemodynamics  Goal: Patient's hemodynamics, fluid balance and neurologic status will be stable or improve  Outcome: Progressing     Problem: Respiratory  Goal: Patient will achieve/maintain optimum respiratory ventilation and gas exchange  Outcome: Progressing     Problem: Fluid Volume  Goal: Fluid volume balance will be maintained  Outcome: Progressing     Problem: Nutrition  Goal: Patient's nutritional and fluid intake will be adequate or improve  Outcome: Progressing     Problem: Urinary Elimination  Goal:  Establish and maintain regular urinary output  Outcome: Progressing     Problem: Bowel Elimination  Goal: Establish and maintain regular bowel function  Outcome: Progressing     Problem: Mobility  Goal: Patient's capacity to carry out activities will improve  Outcome: Progressing     Problem: Self Care  Goal: Patient will have the ability to perform ADLs independently or with assistance (bathe, groom, dress, toilet and feed)  Outcome: Progressing     Problem: Infection - Standard  Goal: Patient will remain free from infection  Outcome: Progressing

## 2024-06-22 NOTE — PROGRESS NOTES
Bedside report received at 1900 and assumed care of patient. Resting in bed. A&O x4 on RA. Tele monitoring in place. Educated on fall risk, all fall precautions in place. Call light within reach, bed locked and in lowest position, denied other needs at this time. Hourly rounding in place.

## 2024-06-22 NOTE — CARE PLAN
The patient is Stable - Low risk of patient condition declining or worsening    Shift Goals  Clinical Goals: VSS, pain management  Patient Goals: comfort  Family Goals: DANIS    Progress made toward(s) clinical / shift goals:      Problem: Psychosocial  Goal: Patient's level of anxiety will decrease  Outcome: Progressing   Patient is calm with unlabored breathing    Problem: Communication  Goal: The ability to communicate needs accurately and effectively will improve  Outcome: Progressing   POC discussed, all questions answered at this time    Problem: Mobility  Goal: Patient's capacity to carry out activities will improve  Outcome: Progressing   Patient is able to walk unassisted and calls appropriately. Patient remains free from falls    Patient is not progressing towards the following goals:

## 2024-06-22 NOTE — PROGRESS NOTES
"Hospital Medicine Daily Progress Note    Date of Service  6/22/2024    Chief Complaint  César Cardoso is a 68 y.o. male admitted 6/18/2024 with chest pain    Hospital Course  Mr. Lindsey \"JUAN\" Varinder Cardoso is a 68 y.o. male with past medical history of CAD, multiple MIs and stents placement a few years ago, A-fib on Eliquis, type 2 diabetes, hypertension, smoking, who presented 6/18/2024 with recurrent chest pain.       He states that chest pain started at 5 PM when he was on a train to [place] where he reportedly lives, and he had to be taken off the train and brought to the emergency department.  Since then the chest pain has been on and off at rest without elevating aggravating factors radiating to the left part of the chest.  Nitroglycerin is not working and he is asking for other medications for pain. Per chart review he was admitted to [facility] in May, had stress test done, offered coronary angiography and left AMA.  There was suspicions that he is seeking opiates.  Currently he rates his pain 7 out of 10. At this time he would agree for coronary angiography if offered. His initial evaluation with repeat troponin, EKG and chest x-ray are nonconcerning for acute MI.      On examination, patient reports that he has a stressful job and is always on the road.  Patient reports patient normally has chest pain with this current chest pain, this feels like one of his previous MI.  Patient endorses that his troponin level usually is negative along with cardiac stress test, however, with his previous MI in the past, this was diagnosed by doing an angiogram.     Patient also noted to have elevated blood pressure with SBP ranging from 150-170s.  Patient claims that he is compliant with all his medication.  Will trial patient on additional hydralazine.  Patient also reports that Nitropaste works better for his chest pain to control blood pressure along with the pain, however, we will hold " off until we hear further from cardiology as we are hoping to pursue a NM stress test.    Cardiology Dr. Knapp  was consulted.  Per cardiology, we will pursue LHC to rule out ACS.  Also continue to control patient's blood pressure.        Interval Problem Update  -Patient seen and examined.  Patient reports improvement with chest pain.  Patient's blood pressure has been more controlled with addition of hydralazine along with Nitropaste.  Patient's blood pressure SBP ranging in 100-1 teens.  Per discussion with cardiology, patient is anticipated to have LHC on 2/21 due to Eliquis.  Plan of care: Continue telemetry monitoring; monitor patient's blood pressure; prepped patient for an LHC  Disposition: Patient anticipated to stay overnight until C and further recommendations from cardiology has been made  Lab work: Reviewed; expected  VSS at this time    Above per previous hospitalist.    6/21/2024  Patient was seen and examined on the telemetry floor.  He continues on continuous cardiac monitoring.  Patient is awaiting left heart catheterization.  Continues to have chest pain intermittently, for which she has been taking oxycodone.    6/22/2024  Patient was seen and examined on the telemetry floor.  He continues on continuous cardiac monitoring.    Patient's left heart catheterization was canceled yesterday after the patient refused stating that he was waiting for his wife to arrive before undergoing the catheterization.  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  I explained to the patient that he could have asked me or even our nurses to save keep the equipment containing sensitive information.  He stated that he was not aware of this and would have done that and underwent a left heart catheterization added on.  Patient states that the wife's car was being repaired and cannot be at the hospital  yesterday.  However, she could come to the hospital today and the patient is willing to proceed with the left heart catheterization.  Per notes, patient was offered security services to lock down his belongings but he refused.    I was informed of nursing that the patient is currently suing 2 other hospitals in the local area with accusations of his belongings being stolen or not returned to him.    I discussed case with Dr. Knapp and explained to him the patient's propensity [facility].  Per notes, this is his sixth visit in the emergency room since March 2024 I different states and hospitals reported history of malingering frequently leaving AMA.  In fact, the patient was asking for my name to write down.  Dr. Knapp stated that they rescheduled his left heart catheterization twice and that they would not proceed with the heart cath unless he had an urgent such as STEMI.      Patient continues to report intermittent chest pain that is substernal in nature radiating to his left side of jaw and neck as well as left arm.  He is continue to receive oxycodone as well as IV morphine for his complaints of pain, which he continues to rate 7-8 but continues to appear to be in no significant distress.  However, he seems very calm and in no significant distress.  He is now reporting concerns of melena.  Starting hemoglobin checks every 4 hours.    He states that he is in a very stressful job with [employer], which he will be stopping in 2 weeks.  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        I have discussed this patient's plan of care and discharge plan at IDT rounds today with Case Management, Nursing, Nursing leadership, and other members of the IDT team.    Consultants/Specialty  cardiology - Dr. Knapp    Code Status  Full Code    Disposition  The patient is not medically cleared for discharge to home or a post-acute facility.  Anticipate discharge to: home with close outpatient follow-up    I have placed  the appropriate orders for post-discharge needs.    Review of Systems  Review of Systems   Constitutional:  Negative for chills and fever.   Respiratory:  Negative for cough and shortness of breath.    Cardiovascular:  Positive for chest pain. Negative for palpitations.   Gastrointestinal:  Positive for blood in stool and melena. Negative for abdominal pain, nausea and vomiting.   Musculoskeletal:  Negative for joint pain and myalgias.   Neurological:  Negative for dizziness and headaches.        Physical Exam  Temp:  [36.5 °C (97.7 °F)-36.8 °C (98.2 °F)] 36.5 °C (97.7 °F)  Pulse:  [53-70] 53  Resp:  [16-18] 16  BP: ()/(48-73) 159/73  SpO2:  [89 %-95 %] 93 %    Physical Exam  Vitals and nursing note reviewed.   Constitutional:       General: He is not in acute distress.     Appearance: Normal appearance. He is not ill-appearing.   HENT:      Head: Normocephalic and atraumatic.      Mouth/Throat:      Mouth: Mucous membranes are moist.      Pharynx: Oropharynx is clear. No oropharyngeal exudate.   Eyes:      General: No scleral icterus.        Right eye: No discharge.         Left eye: No discharge.      Conjunctiva/sclera: Conjunctivae normal.   Cardiovascular:      Rate and Rhythm: Normal rate and regular rhythm.      Pulses: Normal pulses.      Heart sounds: Normal heart sounds. No murmur heard.  Pulmonary:      Effort: Pulmonary effort is normal. No respiratory distress.      Breath sounds: Normal breath sounds.   Abdominal:      General: Abdomen is flat. Bowel sounds are normal. There is no distension.      Palpations: Abdomen is soft.   Musculoskeletal:         General: No swelling.      Cervical back: Neck supple. No tenderness.      Right lower leg: No edema.      Left lower leg: No edema.   Skin:     General: Skin is warm and dry.      Coloration: Skin is not pale.   Neurological:      Mental Status: He is alert and oriented to person, place, and time. Mental status is at baseline.      Motor: No  weakness.   Psychiatric:         Thought Content: Thought content normal.         Judgment: Judgment normal.         Fluids    Intake/Output Summary (Last 24 hours) at 6/22/2024 1723  Last data filed at 6/22/2024 1713  Gross per 24 hour   Intake 1240 ml   Output 0 ml   Net 1240 ml       Laboratory  Recent Labs     06/20/24 0222 06/21/24 0217   WBC 9.7 6.6   RBC 4.44* 4.05*   HEMOGLOBIN 13.4* 12.1*   HEMATOCRIT 42.0 39.0*   MCV 94.6 96.3   MCH 30.2 29.9   MCHC 31.9* 31.0*   RDW 46.0 47.2   PLATELETCT 296 228   MPV 9.5 9.5     Recent Labs     06/20/24 0222 06/21/24 0217   SODIUM 135 140   POTASSIUM 5.2 4.8   CHLORIDE 105 107   CO2 20 21   GLUCOSE 155* 134*   BUN 25* 25*   CREATININE 0.94 1.12   CALCIUM 9.0 8.7             Recent Labs     06/20/24 0222   TRIGLYCERIDE 181*   HDL 28*   LDL 51       Imaging  DX-CHEST-PORTABLE (1 VIEW)   Final Result         1.  No acute cardiopulmonary disease.           Assessment/Plan  * Chest pain- (present on admission)  Assessment & Plan  68-year-old male with prior MIs and stents placement, presented with resting chest pain with no effect from nitroglycerin, concern for possible unstable angina  Initial EKG and troponin does not show MI.  Plan: Observe on telemetry, repeat troponin  Consider cardiology consult for coronary angiography.  Will not order stress test since it was done recently and was normal  Will put nitroglycerin ointment.  IV morphine if pain remains uncontrolled  Consulted cardiology Dr. Knapp  -recommendations to pursue [facility]  Nitropaste also added    6/21/2024  Await left heart catheterization.  Continue oxycodone as needed for pain control.  Continuous cardiac monitoring.    6/22/2024  Continuing intermittent chest pain radiating to the neck and left arm.  Continue to receive oxycodone and IV morphine as needed for pain  Continuous pulse oximetry monitoring to monitor for hypoxia and respiratory depression while receiving IV narcotic  medications.  Continuous cardiac monitoring  Serial troponins and EKGs as needed  Trial of Carafate and docusate as well as MiraLAX.  Continue home omeprazole  Patient has anaphylactic allergy to lidocaine so cannot receive GI cocktail    Complaint of melena- (present on admission)  Assessment & Plan  6/22/2024  Patient reporting melena with hematochezia today.  Check hemoglobin every 4 hours.  Transfuse for hemoglobin less than 7.  We will consult gastroenterology if persistent melena.  Patient is on apixaban and aspirin is a high risk of GI bleeding.  Will continue these medications at this time until definitive evidence of melena.    Type 2 diabetes mellitus with hyperglycemia, without long-term current use of insulin (HCC)- (present on admission)  Assessment & Plan  Will hold metformin  Observe blood sugars.  Currently resting blood sugar 121  Patient placed on SSI    6/21/2024  Blood glucose controlled  No insulin coverage needed    Smoking- (present on admission)  Assessment & Plan  Patient educated and counseled in regard to smoking cessation  Nicotine replacement therapy added      CAD (coronary artery disease)- (present on admission)  Assessment & Plan  Reports multiple history of MI with stents  No records of stents seen.    6/21/2024  Awaiting left heart catheterization    6/22/2024  Cardiology does not feel left heart catheterization indicated at this time    Malingerer- (present on admission)  Assessment & Plan  History of this as patient has had multiple admissions not only in our facility but also other facilities around the area    HLD (hyperlipidemia)- (present on admission)  Assessment & Plan  Continue home medications    Primary hypertension- (present on admission)  Assessment & Plan  Continue lisinopril, metoprolol  Monitor blood pressure  Added hydralazine 10 mg 3 times daily         VTE prophylaxis:    therapeutic anticoagulation with eliquis 5 mg BID      I have performed a physical exam and  reviewed and updated ROS and Plan today (6/22/2024). In review of yesterday's note (6/21/2024), there are no changes except as documented above.       Greater than 54 minutes spent prepping to see patient (e.g. review of tests) obtaining and/or reviewing separately obtained history. Performing a medically appropriate examination and evaluation.  Counseling and educating the patient/family/caregiver.  Ordering medications, tests, or procedures.  Referring and communicating with other health care professionals.  Documenting clinical information in EPIC.  Independently interpreting results and communicating results to patient/family/caregiver.  Care coordination.

## 2024-06-23 ENCOUNTER — APPOINTMENT (OUTPATIENT)
Dept: RADIOLOGY | Facility: MEDICAL CENTER | Age: 69
DRG: 392 | End: 2024-06-23
Attending: STUDENT IN AN ORGANIZED HEALTH CARE EDUCATION/TRAINING PROGRAM
Payer: MEDICARE

## 2024-06-23 PROBLEM — I20.0 UNSTABLE ANGINA (HCC): Status: RESOLVED | Noted: 2024-06-20 | Resolved: 2024-06-23

## 2024-06-23 PROBLEM — K92.1 COMPLAINT OF MELENA: Status: RESOLVED | Noted: 2024-06-22 | Resolved: 2024-06-23

## 2024-06-23 PROBLEM — Z76.5 DRUG-SEEKING BEHAVIOR: Status: ACTIVE | Noted: 2024-06-23

## 2024-06-23 LAB
EKG IMPRESSION: NORMAL
GLUCOSE BLD STRIP.AUTO-MCNC: 134 MG/DL (ref 65–99)
GLUCOSE BLD STRIP.AUTO-MCNC: 139 MG/DL (ref 65–99)
GLUCOSE BLD STRIP.AUTO-MCNC: 144 MG/DL (ref 65–99)
GLUCOSE BLD STRIP.AUTO-MCNC: 185 MG/DL (ref 65–99)
HCT VFR BLD AUTO: 37.6 % (ref 42–52)
HCT VFR BLD AUTO: 39.4 % (ref 42–52)
HCT VFR BLD AUTO: 39.9 % (ref 42–52)
HCT VFR BLD AUTO: 42.5 % (ref 42–52)
HGB BLD-MCNC: 12.3 G/DL (ref 14–18)
HGB BLD-MCNC: 12.5 G/DL (ref 14–18)
HGB BLD-MCNC: 12.8 G/DL (ref 14–18)
HGB BLD-MCNC: 13.2 G/DL (ref 14–18)
TROPONIN T SERPL-MCNC: 17 NG/L (ref 6–19)
TROPONIN T SERPL-MCNC: 18 NG/L (ref 6–19)

## 2024-06-23 PROCEDURE — 82962 GLUCOSE BLOOD TEST: CPT | Mod: 91

## 2024-06-23 PROCEDURE — 700102 HCHG RX REV CODE 250 W/ 637 OVERRIDE(OP): Performed by: INTERNAL MEDICINE

## 2024-06-23 PROCEDURE — 99222 1ST HOSP IP/OBS MODERATE 55: CPT | Performed by: SPECIALIST

## 2024-06-23 PROCEDURE — A9270 NON-COVERED ITEM OR SERVICE: HCPCS

## 2024-06-23 PROCEDURE — A9270 NON-COVERED ITEM OR SERVICE: HCPCS | Performed by: NURSE PRACTITIONER

## 2024-06-23 PROCEDURE — 700102 HCHG RX REV CODE 250 W/ 637 OVERRIDE(OP)

## 2024-06-23 PROCEDURE — 700102 HCHG RX REV CODE 250 W/ 637 OVERRIDE(OP): Performed by: NURSE PRACTITIONER

## 2024-06-23 PROCEDURE — 700111 HCHG RX REV CODE 636 W/ 250 OVERRIDE (IP): Performed by: STUDENT IN AN ORGANIZED HEALTH CARE EDUCATION/TRAINING PROGRAM

## 2024-06-23 PROCEDURE — 85014 HEMATOCRIT: CPT

## 2024-06-23 PROCEDURE — 770001 HCHG ROOM/CARE - MED/SURG/GYN PRIV*

## 2024-06-23 PROCEDURE — 99233 SBSQ HOSP IP/OBS HIGH 50: CPT | Performed by: STUDENT IN AN ORGANIZED HEALTH CARE EDUCATION/TRAINING PROGRAM

## 2024-06-23 PROCEDURE — 93010 ELECTROCARDIOGRAM REPORT: CPT | Performed by: INTERNAL MEDICINE

## 2024-06-23 PROCEDURE — 700102 HCHG RX REV CODE 250 W/ 637 OVERRIDE(OP): Performed by: STUDENT IN AN ORGANIZED HEALTH CARE EDUCATION/TRAINING PROGRAM

## 2024-06-23 PROCEDURE — 700111 HCHG RX REV CODE 636 W/ 250 OVERRIDE (IP)

## 2024-06-23 PROCEDURE — 78452 HT MUSCLE IMAGE SPECT MULT: CPT

## 2024-06-23 PROCEDURE — A9270 NON-COVERED ITEM OR SERVICE: HCPCS | Performed by: INTERNAL MEDICINE

## 2024-06-23 PROCEDURE — A9270 NON-COVERED ITEM OR SERVICE: HCPCS | Performed by: STUDENT IN AN ORGANIZED HEALTH CARE EDUCATION/TRAINING PROGRAM

## 2024-06-23 PROCEDURE — 85018 HEMOGLOBIN: CPT

## 2024-06-23 PROCEDURE — 84484 ASSAY OF TROPONIN QUANT: CPT

## 2024-06-23 PROCEDURE — 36415 COLL VENOUS BLD VENIPUNCTURE: CPT

## 2024-06-23 RX ORDER — ISOSORBIDE MONONITRATE 30 MG/1
90 TABLET, EXTENDED RELEASE ORAL EVERY MORNING
Qty: 30 TABLET | Refills: 0 | Status: SHIPPED | OUTPATIENT
Start: 2024-06-24

## 2024-06-23 RX ORDER — SUCRALFATE ORAL 1 G/10ML
1 SUSPENSION ORAL EVERY 6 HOURS
Qty: 420 ML | Refills: 0 | Status: SHIPPED | OUTPATIENT
Start: 2024-06-23

## 2024-06-23 RX ORDER — REGADENOSON 0.08 MG/ML
0.4 INJECTION, SOLUTION INTRAVENOUS ONCE
Status: COMPLETED | OUTPATIENT
Start: 2024-06-23 | End: 2024-06-23

## 2024-06-23 RX ORDER — CARVEDILOL 12.5 MG/1
12.5 TABLET ORAL 2 TIMES DAILY WITH MEALS
Qty: 60 TABLET | Refills: 0 | Status: SHIPPED | OUTPATIENT
Start: 2024-06-23

## 2024-06-23 RX ORDER — REGADENOSON 0.08 MG/ML
INJECTION, SOLUTION INTRAVENOUS
Status: COMPLETED
Start: 2024-06-23 | End: 2024-06-23

## 2024-06-23 RX ORDER — LORAZEPAM 2 MG/ML
1 INJECTION INTRAMUSCULAR
Status: COMPLETED | OUTPATIENT
Start: 2024-06-23 | End: 2024-06-23

## 2024-06-23 RX ORDER — AMINOPHYLLINE 25 MG/ML
100 INJECTION, SOLUTION INTRAVENOUS
Status: DISCONTINUED | OUTPATIENT
Start: 2024-06-23 | End: 2024-06-24 | Stop reason: HOSPADM

## 2024-06-23 RX ORDER — MORPHINE SULFATE 4 MG/ML
2 INJECTION INTRAVENOUS
Status: DISCONTINUED | OUTPATIENT
Start: 2024-06-23 | End: 2024-06-23

## 2024-06-23 RX ORDER — NICOTINE 21 MG/24HR
1 PATCH, TRANSDERMAL 24 HOURS TRANSDERMAL EVERY 24 HOURS
Qty: 28 PATCH | Refills: 0 | Status: SHIPPED | OUTPATIENT
Start: 2024-06-23

## 2024-06-23 RX ADMIN — DOCUSATE SODIUM 100 MG: 100 CAPSULE, LIQUID FILLED ORAL at 16:52

## 2024-06-23 RX ADMIN — REGADENOSON 0.4 MG: 0.08 INJECTION, SOLUTION INTRAVENOUS at 14:15

## 2024-06-23 RX ADMIN — DOCUSATE SODIUM 100 MG: 100 CAPSULE, LIQUID FILLED ORAL at 05:33

## 2024-06-23 RX ADMIN — MORPHINE SULFATE 2 MG: 4 INJECTION INTRAVENOUS at 03:00

## 2024-06-23 RX ADMIN — OXYCODONE HYDROCHLORIDE 5 MG: 5 TABLET ORAL at 08:27

## 2024-06-23 RX ADMIN — APIXABAN 5 MG: 5 TABLET, FILM COATED ORAL at 16:52

## 2024-06-23 RX ADMIN — MORPHINE SULFATE 2 MG: 4 INJECTION INTRAVENOUS at 09:38

## 2024-06-23 RX ADMIN — LORAZEPAM 1 MG: 2 INJECTION INTRAMUSCULAR; INTRAVENOUS at 12:17

## 2024-06-23 RX ADMIN — ATORVASTATIN CALCIUM 40 MG: 40 TABLET, FILM COATED ORAL at 16:52

## 2024-06-23 RX ADMIN — RANOLAZINE 500 MG: 500 TABLET, EXTENDED RELEASE ORAL at 16:52

## 2024-06-23 RX ADMIN — APIXABAN 5 MG: 5 TABLET, FILM COATED ORAL at 05:33

## 2024-06-23 RX ADMIN — LISINOPRIL 20 MG: 20 TABLET ORAL at 05:33

## 2024-06-23 RX ADMIN — SENNOSIDES AND DOCUSATE SODIUM 2 TABLET: 50; 8.6 TABLET ORAL at 16:52

## 2024-06-23 RX ADMIN — SUCRALFATE ORAL 1 G: 1 SUSPENSION ORAL at 05:33

## 2024-06-23 RX ADMIN — HYDROXYZINE HYDROCHLORIDE 25 MG: 25 TABLET ORAL at 05:33

## 2024-06-23 RX ADMIN — ASPIRIN 81 MG: 81 TABLET, COATED ORAL at 05:32

## 2024-06-23 RX ADMIN — OXYCODONE HYDROCHLORIDE 5 MG: 5 TABLET ORAL at 00:47

## 2024-06-23 RX ADMIN — SUCRALFATE ORAL 1 G: 1 SUSPENSION ORAL at 15:14

## 2024-06-23 RX ADMIN — OXYCODONE HYDROCHLORIDE 5 MG: 5 TABLET ORAL at 05:32

## 2024-06-23 RX ADMIN — HYDROXYZINE HYDROCHLORIDE 25 MG: 25 TABLET ORAL at 12:16

## 2024-06-23 RX ADMIN — INSULIN HUMAN 1 UNITS: 100 INJECTION, SOLUTION PARENTERAL at 16:54

## 2024-06-23 RX ADMIN — HYDROXYZINE HYDROCHLORIDE 25 MG: 25 TABLET ORAL at 16:52

## 2024-06-23 RX ADMIN — SUCRALFATE ORAL 1 G: 1 SUSPENSION ORAL at 00:00

## 2024-06-23 RX ADMIN — HYDRALAZINE HYDROCHLORIDE 10 MG: 10 TABLET, FILM COATED ORAL at 21:55

## 2024-06-23 RX ADMIN — OXYCODONE HYDROCHLORIDE 5 MG: 5 TABLET ORAL at 15:22

## 2024-06-23 RX ADMIN — NICOTINE TRANSDERMAL SYSTEM 21 MG: 21 PATCH, EXTENDED RELEASE TRANSDERMAL at 05:31

## 2024-06-23 RX ADMIN — RANOLAZINE 500 MG: 500 TABLET, EXTENDED RELEASE ORAL at 05:31

## 2024-06-23 RX ADMIN — SUCRALFATE ORAL 1 G: 1 SUSPENSION ORAL at 16:52

## 2024-06-23 RX ADMIN — HYDRALAZINE HYDROCHLORIDE 10 MG: 10 TABLET, FILM COATED ORAL at 15:13

## 2024-06-23 RX ADMIN — OMEPRAZOLE 40 MG: 20 CAPSULE, DELAYED RELEASE ORAL at 05:33

## 2024-06-23 RX ADMIN — ISOSORBIDE MONONITRATE 90 MG: 30 TABLET, EXTENDED RELEASE ORAL at 08:19

## 2024-06-23 RX ADMIN — CLOPIDOGREL BISULFATE 75 MG: 75 TABLET ORAL at 05:33

## 2024-06-23 ASSESSMENT — ENCOUNTER SYMPTOMS
FEVER: 0
BLOOD IN STOOL: 0
DIZZINESS: 0
NAUSEA: 0
SHORTNESS OF BREATH: 0
MYALGIAS: 0
PALPITATIONS: 0
COUGH: 0
ABDOMINAL PAIN: 0
VOMITING: 0
CHILLS: 0
HEADACHES: 0

## 2024-06-23 ASSESSMENT — PAIN DESCRIPTION - PAIN TYPE
TYPE: ACUTE PAIN
TYPE: ACUTE PAIN

## 2024-06-23 ASSESSMENT — PAIN SCALES - WONG BAKER: WONGBAKER_NUMERICALRESPONSE: HURTS A WHOLE LOT

## 2024-06-23 ASSESSMENT — FIBROSIS 4 INDEX: FIB4 SCORE: 1.05

## 2024-06-23 NOTE — DISCHARGE SUMMARY
"Discharge Summary    CHIEF COMPLAINT ON ADMISSION  Chief Complaint   Patient presents with    Chest Pain     Pt reports squeezing CP in center chest radiating to L arm. Pt has hx of 4 MI with 4 stent placements. Pt states this feels similar. Pt was worked up at [facility] and everything was negative, pt called EMS and was brought here for further evaluation.       Reason for Admission  Chest pain in setting of prior myocardial infarction and stents    Admission Date  6/18/2024    CODE STATUS  Full Code    HPI & HOSPITAL COURSE  Mr. Lindsey \"JUAN\" Varinder Cardoso is a 68 y.o. male with past medical history of CAD, multiple MIs and stents placement a few years ago, atrial fibrillation on Eliquis, type 2 diabetes, hypertension, smoking, who presented 6/18/2024 with recurrent chest pain.       He stated that chest pain started at 5 PM when he was on a train to [place] where he reportedly lives, and he had to be taken off the train and brought to the emergency department.  Since then the chest pain has been on and off at rest without elevating aggravating factors radiating to the left part of the chest.  Nitroglycerin was not working and he is asking for other medications for pain. Per chart review, he was admitted to [facility] in May, had stress test done, offered coronary angiography and left AMA.  There was concern that the patient was seeking opiates.  He was rating his pain 7 out of 10.  On admission, the patient agreed to proceed to an angiogram of offered.  His initial evaluation with repeat troponin, EKG and chest x-ray are nonconcerning for acute MI.      On presentation, patient reported that he has a stressful job and was always on the road.  He stated that he was an  for [employer] and that he had only 2 weeks of work left and was happy about that.  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      Patient reported that he normally has chest pain with recurrent chest " "pain but this 1 felt similar to his previous myocardial infarction.  He described a substernal chest pain radiating to the left side of his neck and his left arm.  Repeated on recurring chest pains.  He reported that his troponin was usually negative along with cardiac stress testing.  However, his previous myocardial infarction was diagnosed by a angiogram.      Patient was also noted to have elevated blood pressure with SBP ranging from 150-170s.  Patient claims that he was compliant with all his medication.  Upon presentation, troponins were were 18, 22, and 20 mildly elevated.  EKG did not reveal any ischemic changes.  Chest x-ray was unremarkable.    Cardiology was consulted, and recommended to proceed with left heart catheterization to rule out acute coronary syndrome.  He continued to have intermittent chest pain that was substernal radiating to the left-sided neck and left arm.  During these complaints and episodes, he did not appear to be in any significant distress.  Constantly stated that he was \"not doing well.\"    Cardiology Dr. Knapp  was consulted.  Per cardiology, we will pursue LHC to rule out ACS.  Also continue to control patient's blood pressure.  Patient requested to postpone the left heart catheterization until he had more time off of apixaban, to which cardiology agree.  When he was rescheduled, patient asked and morning to delayed the left heart catheterization until his wife could arrive to supervises IT equipment, which she stated contained sensitive work-related conference for information as he worked for [employer].  He was offered security services to lock up his belongings but refused.  Patient's left heart catheterization was canceled due to his refusal to proceed as scheduled despite extensive education and multiple attempts to proceed due to importance to further assess his coronary anatomy.  This was the second time he refused in 2 days.  On the following day, patient stated that his " "wife had gotten  her car fixed and was ready to proceed with the left heart catheterization.  However, cardiology stated that they would not proceed with left heart catheterization unless he had an urgent need such as a ST elevation myocardial infarction.  They felt that his chest pain was noncardiac related.  The care team attempted to reach patient's wife but could never be reached.  She was never seen at the hospital.    Patient was agreeable to trial of GI cocktail for empiric treatment of GERD as his source of chest pain.,  However he had a lidocaine allergy that precluded its use.  He was started on sucralfate as well as continued on omeprazole.    Patient's IV morphine was discontinued by the day provider but somehow requested the nighttime provider to resume the IV morphine.  He continued on oxycodone for his chest pain.  He started to complain of melena and a little bit of hematochezia.  Patient was noted to have 1 episode of bright red blood per rectum but did not have another.  When described what melena was, he denied having it at all.  He serial hemoglobin checks, which remained relatively stable.      Per records, he had a stress test performed in May 2024 that was negative at another hospital.  However, due to patient's mildly elevated troponin levels of 20 and 22, patient underwent another nuclear medicine stress testing. Before undergoing the stress test, he stated that he would only undergo the testing if he received IV Ativan.  Stress testing showed normal left ventricular wall motion with ejection fraction of 66%.  No reversible defects that would indicate ischemia.  At this point, patient was medically cleared for discharge to home.  Patient was told that all narcotic pain medications will be discontinued and he was agreeable to that.  Patient appealed his discharge on 6/24/2024.      On the following morning, when I entered the room, patient stated \" I do not want anything to do with you.  I am " "going to jocelyn you!\"  The case was discussed with Gildardo Serrano director risk management.  When I reentered the room to give him a business card and also the phone number for Gildardo Serrano, he stated, \" I'm not going to jocelyn you.  I'm going to jocelyn [facility].\"  The patient told nursing staff that he was already suing [facility] and [facility] for not returning his belongings/equipment.    There was significant concern from the care team that the patient was motivated to continue his hospitalization to receive narcotic IV and oral pain medications.  Patient expressed extreme appreciation to the providers, who were continuing to provide narcotic pain medications.    Per medical record review, patient was noted to be traveling on a train, to town visiting ERs presenting with the same exact symptoms including substernal chest pain radiating to the neck and his left arm.  There was ongoing concerns of malingering with pain medication seeking behavior from his care team.  Per sources, there was concern of possible ongoing scamming activity dating back to 2011 spanning [place] and [facility].    Per discharge summary from Dr. Ce Wilder on 6/2/2011:  \"Refer to H&P for further details. Patient left against medical advice. Likely drug seeking behavior. Briefly 54 yo male who presented for chest pain with reported history of CAD with MI in 2003 and 2005 with cath and stent placement. Patient stated that he had a similar event 1 week ago where he presented to [facility] but left against medical advice because he did not want to work with cardiology group [employer] due to his past experience with them which resulted in him winning a law suit. Patient was admitted for unstable angina, due to his CAD history. His ACS work up was negative, refer to labs and procedures above. Because of his history cardiology felt that cardiac catheterization was necessary.      Patient refused cardiac " "cath multiple times during his stay because his wife was not present to guard his work belongings. He would not allow hospital staff to store his belongings because he works in the [employer] business and stated that it is against job regulations and the patriot act to leave communication equipment unattended. While the procedure was delayed to wait for the patient's wife to arrive from [place], the patient used extensive amounts of narcotics. Eventually the patient became agitated and decided to leave against medical advice. Upon prior chart review by cardiology, it was discovered that the patient has never had stents placed, that prior attempts to obtain old charts from [place] have been unsuccessful, and that an angiogram performed at [facility] in 2006 does not support his claims. It was felt that the patient demonstrated drug seeking behavior and purposely delayed his cardiac cath to obtain more narcotics for pain control.\"    Patient was offered refills on his medications but declined stating that [facility] would take care of them.    Therefore, he is discharged in good and stable condition to home with close outpatient follow-up.    The patient met 2-midnight criteria for an inpatient stay at the time of discharge.      Discharge Date  6/24/2024    FOLLOW UP ITEMS POST DISCHARGE  -Follow-up primary care provider in 3 to 5 days.    DISCHARGE DIAGNOSES  Principal Problem:    Chest pain (POA: Yes)  Active Problems:    HLD (hyperlipidemia) (POA: Yes)    Malingerer (POA: Yes)    CAD (coronary artery disease) (POA: Yes)    Smoking (POA: Yes)    Type 2 diabetes mellitus with hyperglycemia, without long-term current use of insulin (HCC) (POA: Yes)    Drug-seeking behavior (POA: Yes)  Resolved Problems:    Complaint of melena (POA: Yes)    Primary hypertension (POA: Yes)    Unstable angina (HCC) (POA: Yes)      FOLLOW UP  No future appointments.  No follow-up provider specified.    MEDICATIONS ON DISCHARGE   "   Medication List        START taking these medications        Instructions   carvedilol 12.5 MG Tabs  Commonly known as: Coreg   Take 1 Tablet by mouth 2 times a day with meals.  Dose: 12.5 mg     nicotine 21 MG/24HR Pt24  Commonly known as: Nicoderm   Place 1 Patch on the skin every 24 hours.  Dose: 1 Patch     sucralfate 1 GM/10ML Susp  Commonly known as: Carafate   Take 10 mL by mouth every 6 hours.  Dose: 1 g            CHANGE how you take these medications        Instructions   isosorbide mononitrate SR 30 MG Tb24  What changed:   how much to take  Another medication with the same name was removed. Continue taking this medication, and follow the directions you see here.  Commonly known as: Imdur   Take 3 Tablets by mouth every morning.  Dose: 90 mg            CONTINUE taking these medications        Instructions   apixaban 5mg Tabs  Commonly known as: Eliquis   Take 5 mg by mouth 2 times a day.  Dose: 5 mg     aspirin 325 MG Tabs  Commonly known as: Asa   Take 325 mg by mouth every morning.  Dose: 325 mg     atorvastatin 10 MG Tabs  Commonly known as: Lipitor   Take 10 mg by mouth every morning.  Dose: 10 mg     clopidogrel 75 MG Tabs  Commonly known as: Plavix   Take 75 mg by mouth every morning.  Dose: 75 mg     furosemide 20 MG Tabs  Commonly known as: Lasix   Take 20 mg by mouth every day.  Dose: 20 mg     insulin aspart 100 UNIT/ML Soln  Commonly known as: NovoLOG   Inject 1-3 Units under the skin 1 time a day as needed for High Blood Sugar (BS > 200).  Dose: 1-3 Units     Jardiance 25 MG Tabs  Generic drug: Empagliflozin   Take 25 mg by mouth every day.  Dose: 25 mg     lisinopril 10 MG Tabs  Commonly known as: Prinivil   Take 10 mg by mouth every morning.  Dose: 10 mg     metFORMIN 500 MG Tabs  Commonly known as: Glucophage   Take 500 mg by mouth every day.  Dose: 500 mg     nitroglycerin 0.4 MG Subl  Commonly known as: Nitrostat   Place 0.4 mg under tongue every 5 minutes as needed. Indications: Chest  "pain  Dose: 0.4 mg     omeprazole 40 MG delayed-release capsule  Commonly known as: PriLOSEC   Take 40 mg by mouth every morning.  Dose: 40 mg     Victoza 18 MG/3ML Sopn  Generic drug: liraglutide   Inject 1.2 mg under the skin every day.  Dose: 1.2 mg            STOP taking these medications      metoprolol SR 25 MG Tb24  Commonly known as: Toprol XL              Allergies  Allergies   Allergen Reactions    Lidocaine Hcl Anaphylaxis     \"All irvin.\"    Ketorolac Tromethamine Hives    Metoclopramide Anxiety     Claustrophobia.       DIET  Orders Placed This Encounter   Procedures    Diet Order Diet: Cardiac     Standing Status:   Standing     Number of Occurrences:   1     Order Specific Question:   Diet:     Answer:   Cardiac [6]       ACTIVITY  As tolerated.  Weight bearing as tolerated    CONSULTATIONS  Cardiology  Gastroenterology    PROCEDURES  None    LABORATORY  Lab Results   Component Value Date    SODIUM 140 06/21/2024    POTASSIUM 4.8 06/21/2024    CHLORIDE 107 06/21/2024    CO2 21 06/21/2024    GLUCOSE 134 (H) 06/21/2024    BUN 25 (H) 06/21/2024    CREATININE 1.12 06/21/2024    CREATININE 0.9 12/06/2007    GLOMRATE 111 07/04/2022        Lab Results   Component Value Date    WBC 6.6 06/21/2024    HEMOGLOBIN 12.8 (L) 06/23/2024    HEMATOCRIT 39.9 (L) 06/23/2024    PLATELETCT 228 06/21/2024        Total time of the discharge process 32 minutes.  "

## 2024-06-23 NOTE — PROGRESS NOTES
Bedside report received at 1900 and assumed care of patient. Resting in bed, endorsing6/10 pain. A&O x4 on RA. Tele monitoring in place. Educated on fall risk, all fall precautions in place. Call light within reach, bed locked and in lowest position, denied other needs at this time. Hourly rounding in place

## 2024-06-23 NOTE — ASSESSMENT & PLAN NOTE
6/22/2024  Patient reporting melena with hematochezia today.  Check hemoglobin every 4 hours.  Transfuse for hemoglobin less than 7.  We will consult gastroenterology if persistent melena.  Patient is on apixaban and aspirin is a high risk of GI bleeding.  Will continue these medications at this time until definitive evidence of melena.

## 2024-06-23 NOTE — DISCHARGE PLANNING
1600, IMM provided and explained to Pt. IMM was signed by Pt. Pt said he will appeal his discharge. Pt was provided a copy of signed IMM with the [facility] # [phone] to call for the Appeal.

## 2024-06-23 NOTE — PROGRESS NOTES
Patient has returned from stress test and states that all of his belongings are in the room as when he left for the procedure. VSS, pt reports 7/10 pain- prn given.

## 2024-06-23 NOTE — CARE PLAN
The patient is Stable - Low risk of patient condition declining or worsening    Shift Goals  Clinical Goals: Monitor VS, stool sample, Q4 H&H, manage CP  Patient Goals: updates on POC  Family Goals: DANIS    Progress made toward(s) clinical / shift goals:      Patient is not progressing towards the following goals:      Problem: Pain - Standard  Goal: Alleviation of pain or a reduction in pain to the patient’s comfort goal  Outcome: Progressing     Problem: Knowledge Deficit - Standard  Goal: Patient and family/care givers will demonstrate understanding of plan of care, disease process/condition, diagnostic tests and medications  Outcome: Progressing     Problem: Psychosocial  Goal: Patient's level of anxiety will decrease  Outcome: Progressing  Goal: Patient's ability to verbalize feelings about condition will improve  Outcome: Progressing  Goal: Patient's ability to re-evaluate and adapt role responsibilities will improve  Outcome: Progressing  Goal: Patient and family will demonstrate ability to cope with life altering diagnosis and/or procedure  Outcome: Progressing  Goal: Spiritual and cultural needs incorporated into hospitalization  Outcome: Progressing     Problem: Communication  Goal: The ability to communicate needs accurately and effectively will improve  Outcome: Progressing     Problem: Discharge Barriers/Planning  Goal: Patient's continuum of care needs are met  Outcome: Progressing     Problem: Hemodynamics  Goal: Patient's hemodynamics, fluid balance and neurologic status will be stable or improve  Outcome: Progressing     Problem: Respiratory  Goal: Patient will achieve/maintain optimum respiratory ventilation and gas exchange  Outcome: Progressing     Problem: Fluid Volume  Goal: Fluid volume balance will be maintained  Outcome: Progressing     Problem: Nutrition  Goal: Patient's nutritional and fluid intake will be adequate or improve  Outcome: Progressing     Problem: Urinary Elimination  Goal:  Establish and maintain regular urinary output  Outcome: Progressing     Problem: Bowel Elimination  Goal: Establish and maintain regular bowel function  Outcome: Progressing     Problem: Mobility  Goal: Patient's capacity to carry out activities will improve  Outcome: Progressing     Problem: Self Care  Goal: Patient will have the ability to perform ADLs independently or with assistance (bathe, groom, dress, toilet and feed)  Outcome: Progressing     Problem: Infection - Standard  Goal: Patient will remain free from infection  Outcome: Progressing

## 2024-06-23 NOTE — PROGRESS NOTES
Monitor summary:        Rhythm: SB/SR   Rate: 54-78, down to 42  Ectopy: (R)PVC  Measurements: 15./.10/.45        12hr chart check

## 2024-06-23 NOTE — CONSULTS
GASTROENTEROLOGY CONSULTATION    PATIENT NAME: César Cardoso  : 1955  CSN: [id number]  MRN:  [id number]     CONSULTATION DATE:  2024    PRIMARY CARE PROVIDER:  Sarkis Russell M.D.      REASON FOR CONSULT:  Melena, mild anemia  Consult requested by Dr. Preston Wang    HISTORY OF PRESENT ILLNESS:  César Cardoso is a 68 y.o. male with a past medical history of CAD, multiple Mi's and stent placements, A. Fib on Eliquis     Patient is having chest pain on admission and actively. He is not in distress or SOB. He said he left [place] and was on train to go home and they pulled him of train due to chest pain and brought him to [place] and then he was transferred to [facility]. He did not get relief with nitroglycerin and is requiring narcotics. His initial evaluation with repeat troponin, EKG and chest x-ray are nonconcerning for acute MI. He is taking Plavix and ASA as well.  Yesterday he had a dark formed stool. It was not smelly or sticky. He had some BRBPR as well . He is scheduled for a cardiac cath but this was put on hold because he didnt want to leave his belongings in his room but he is willing to have it today. He does not want evaluation of GI tract. He does not feel he had melena, but did see a little bright red blood. He has not abdominal pain, no n/v, no trouble swallowing. No weight loss.     PAST MEDICAL HISTORY:  Past Medical History:   Diagnosis Date    CAD (coronary artery disease)     CAD (coronary artery disease) 2013    CHEST PAIN 2013    HLD (hyperlipidemia) 2013    HTN (hypertension) 2013    Hypercholesteremia     Hypertension     MI (myocardial infarction) (HCC)     Occluded coronary artery stent        PAST SURGICAL HISTORY:  Past Surgical History:   Procedure Laterality Date    OTHER CARDIAC SURGERY      3 stents on seperate ocassions    ANGIOPLASTY      OTHER CARDIAC SURGERY      stents x 3        CURRENT MEDS:  Current Facility-Administered  Medications   Medication Dose Route Frequency Provider Last Rate Last Admin    morphine 4 MG/ML injection 2 mg  2 mg Intravenous Q3HRS PRN SIMEON BravoNArinaP.   2 mg at 06/23/24 0938    regadenoson (Lexiscan) injection SOLN 0.4 mg  0.4 mg Intravenous Once Preston Wang M.D.        aminophylline injection 100 mg  100 mg Intravenous Once PRN Preston Wang M.D.        mag hydrox-al hydrox-simeth (Maalox Plus Es Or Mylanta Ds) suspension 10 mL  10 mL Oral 4X/DAY PRN Preston Wang M.D.        polyethylene glycol/lytes (Miralax) Packet 1 Packet  1 Packet Oral BID Preston Wang M.D.   1 Packet at 06/22/24 1806    docusate sodium (Colace) capsule 100 mg  100 mg Oral BID Preston Wang M.D.   100 mg at 06/23/24 0533    sucralfate (Carafate) 1 GM/10ML suspension 1 g  1 g Oral Q6HRS Preston Wang M.D.   1 g at 06/23/24 0533    hydrOXYzine HCl (Atarax) tablet 25 mg  25 mg Oral TID Geraldene R Nikkie, A.P.R.N.   25 mg at 06/23/24 1216    atorvastatin (Lipitor) tablet 40 mg  40 mg Oral Q EVENING Jasmyn JOSE Oviedo, A.P.R.N.   40 mg at 06/22/24 1802    labetalol (Normodyne/Trandate) injection 10 mg  10 mg Intravenous Q4HRS PRN Bert Harvey M.D.        senna-docusate (Pericolace Or Senokot S) 8.6-50 MG per tablet 2 Tablet  2 Tablet Oral Q EVENING Bert Harvey M.D.   2 Tablet at 06/22/24 1803    And    polyethylene glycol/lytes (Miralax) Packet 1 Packet  1 Packet Oral QDAY PRN Bert Harvey M.D.        acetaminophen (Tylenol) tablet 650 mg  650 mg Oral Q6HRS PRN Bert Harvey M.D.        Pharmacy Consult Request ...Pain Management Review 1 Each  1 Each Other PHARMACY TO DOSE Bert Harvey M.D.        oxyCODONE immediate-release (Roxicodone) tablet 2.5 mg  2.5 mg Oral Q3HRS PRN Bert Harvey M.D.        Or    oxyCODONE immediate-release (Roxicodone) tablet 5 mg  5 mg Oral Q3HRS PRN Bert Harvey M.D.   5 mg at 06/23/24 0827    ondansetron (Zofran) syringe/vial injection 4 mg  4 mg Intravenous Q4HRS  "PRN Bert Harvey M.D.        ondansetron (Zofran ODT) dispertab 4 mg  4 mg Oral Q4HRS PRN Bert Harvey M.D.   4 mg at 06/19/24 1750    apixaban (Eliquis) tablet 5 mg  5 mg Oral BID Jasmyn Oviedo, A.P.R.N.   5 mg at 06/23/24 0533    clopidogrel (Plavix) tablet 75 mg  75 mg Oral SOHA Harvey M.D.   75 mg at 06/23/24 0533    isosorbide mononitrate SR (Imdur) tablet 90 mg  90 mg Oral SOHA Harvey M.D.   90 mg at 06/23/24 0819    lisinopril (Prinivil) tablet 20 mg  20 mg Oral SOHA Harvey M.D.   20 mg at 06/23/24 0533    omeprazole (PriLOSEC) capsule 40 mg  40 mg Oral SOHA Harvey M.D.   40 mg at 06/23/24 0533    nicotine (Nicoderm) 21 MG/24HR 21 mg  21 mg Transdermal Daily-0600 Bert Harvey M.D.   21 mg at 06/23/24 0531    hydrALAZINE (Apresoline) tablet 10 mg  10 mg Oral Q8HRS Geraldene R Ralleca-Llaguno, A.P.R.N.   10 mg at 06/22/24 2113    diphenhydrAMINE (Benadryl) injection 12.5 mg  12.5 mg Intravenous Q6HRS PRN Geraldene R Ralleca-Llaguno, A.P.R.N.   12.5 mg at 06/19/24 1010    ketorolac (Toradol) 15 MG/ML injection 15 mg  15 mg Intravenous Q6HRS PRN Geraldene R Ralleca-Llaguno, A.P.R.N.   15 mg at 06/19/24 1037    aspirin EC tablet 81 mg  81 mg Oral DAILY Kai Knapp M.D.   81 mg at 06/23/24 0532    ranolazine (Ranexa) 500 MG SR tablet TB12 500 mg  500 mg Oral BID Kai Knapp M.D.   500 mg at 06/23/24 0531    carvedilol (Coreg) tablet 12.5 mg  12.5 mg Oral BID WITH MEALS Kai Knapp M.D.   12.5 mg at 06/22/24 1802    insulin regular (HumuLIN R,NovoLIN R) injection  1-6 Units Subcutaneous 4X/DAY ACHS ERMA Bravo.N.P.   1 Units at 06/22/24 2110    And    dextrose 50% (D50W) injection 25 g  25 g Intravenous Q15 MIN PRN ERMA Bravo.N.P.            ALLERGIES:  Allergies   Allergen Reactions    Lidocaine Hcl Anaphylaxis     \"All irvin.\"    Ketorolac Tromethamine Hives    Metoclopramide Anxiety     Claustrophobia.       SOCIAL HISTORY:  Social " History     Socioeconomic History    Marital status:      Spouse name: Not on file    Number of children: Not on file    Years of education: Not on file    Highest education level: Not on file   Occupational History    Not on file   Tobacco Use    Smoking status: Every Day     Current packs/day: 0.50     Types: Cigarettes    Smokeless tobacco: Never   Vaping Use    Vaping status: Never Used   Substance and Sexual Activity    Alcohol use: Not Currently     Comment: 2-3 week    Drug use: No    Sexual activity: Not on file   Other Topics Concern    Not on file   Social History Narrative    ** Merged History Encounter **          Social Determinants of Health     Financial Resource Strain: Low Risk  (5/1/2024)    Received from [facility]    Overall Financial Resource Strain (CARDIA)     Difficulty of Paying Living Expenses: Not hard at all   Food Insecurity: No Food Insecurity (6/19/2024)    Hunger Vital Sign     Worried About Running Out of Food in the Last Year: Never true     Ran Out of Food in the Last Year: Never true   Transportation Needs: No Transportation Needs (6/19/2024)    PRAPARE - Transportation     Lack of Transportation (Medical): No     Lack of Transportation (Non-Medical): No   Physical Activity: Not on file   Stress: Not on file   Social Connections: Unknown (2/22/2024)    Received from [facility]    Social Connections     In the past 3 months, do you feel that you lack companionship or social support?: Not on file   Intimate Partner Violence: Patient Declined (6/19/2024)    Humiliation, Afraid, Rape, and Kick questionnaire     Fear of Current or Ex-Partner: Patient declined     Emotionally Abused: Patient declined     Physically Abused: Patient declined     Sexually Abused: Patient declined   Housing Stability: Low Risk  (6/19/2024)    Housing Stability Vital Sign     Unable to Pay for Housing in the Last Year: No     Number of Places Lived in the  "Last Year: 1     Unstable Housing in the Last Year: No       FAMILY HISTORY:  No family history on file.     REVIEW OF SYSTEMS:  General ROS: Negative for - chills, fever, night sweats or weight loss.  HEENT ROS: Negative  Respiratory ROS: Negative for - cough or shortness of breath.  Cardiovascular ROS:  Negative for - chest pain or palpitations.  Gastrointestinal ROS: As per the history of present illness.  Genito-Urinary ROS: Negative  Musculoskeletal ROS: Negative.  Neurological ROS: Negative  Skin ROS: negative  Hematology ROS: negative  Endocrinology ROS: Negative        PHYSICAL EXAM:  VITALS: /66   Pulse (!) 49   Temp 36.4 °C (97.5 °F) (Temporal)   Resp 12   Ht 1.676 m (5' 5.98\")   Wt 88 kg (194 lb 0.1 oz)   SpO2 90%   BMI 31.33 kg/m²   GEN:  César Cardoso is a 68 y.o. male in no acute distress.  HEENT: Mucous membranes pink and moist.  Sclera anicteric.    NECK:    Neck supple without lymphadenopathy or thyromegaly.  LUNGS: Clear to auscultation posteriorly.  HEART: Regular rate and rhythm. S1 and S2 normal. No murmurs, gallops  ABD:  + BS nt/ns -hsm  RECTAL: Not done at this time.  EXT:  Without cyanosis, deformity or pitting edema.  SKIN:  Pink, warm, dry.  NEURO: Grossly intact, A/OR.    LABS:  Recent Labs     06/21/24  0217   WBC 6.6   MCV 96.3     Recent Labs     06/21/24  0217   GLUCOSE 134*   BUN 25*   CO2 21     Lab Results   Component Value Date    INR 1.01 04/24/2022    INR 1.08 12/21/2014    INR 1.07 10/29/2014     No components found for: \"ALT\", \"AST\", \"GGT\", \"ALKPHOS\"  No results found for: \"BILINEO\"      @LASTIMGCAT(VO3268)@     @LASTIMGCAT(HO0376)@       IMPRESSION/PLAN:  Brbpr - on one stool . He has not had another one. When I explained melena, he said it was not this at all. He did have some bright blood. He will save next stool. Patient does not want any GI evaluation. He is willing to do cardiac catheterization this afternoon. We did explain that while " undergoing acute cardiac evaluation, GI evaluation is not the pressing issue unless he is actively bleeding. His vitals are stable, he has not had any further episodes and he has not had a bowel movement.   CAD - normal stress test a month ago. Patient would like to see a cardiologist  DM2  HTN  Anemia - minimal   We will follow closely and if evidence of GI bleed we can reevaluate. Nurse will take a picture of stool.     PPI   H/H every 8 hours       Cata Monae M.D.  Gastroenterology

## 2024-06-23 NOTE — CARE PLAN
The patient is Stable - Low risk of patient condition declining or worsening    Shift Goals  Clinical Goals: monitor and manage chest pain, VSS  Patient Goals: comfort, rest  Family Goals: DANIS    Progress made toward(s) clinical / shift goals:      Problem: Pain - Standard  Goal: Alleviation of pain or a reduction in pain to the patient’s comfort goal  Outcome: Progressing  Patient states pain at 6-8/10 in chest and jaw. Patient is medicated with oxycodone and morphine, rest and repositioning.    Problem: Knowledge Deficit - Standard  Goal: Patient and family/care givers will demonstrate understanding of plan of care, disease process/condition, diagnostic tests and medications  Outcome: Progressing   Patient understands plan of care ongoing.  Problem: Psychosocial  Goal: Patient's level of anxiety will decrease  Outcome: Progressing  Goal: Patient's ability to verbalize feelings about condition will improve  Outcome: Progressing   Patient able to express feelings and concerns. Patient somewhat anxious at times, but able to redirect easily.    Problem: Self Care  Goal: Patient will have the ability to perform ADLs independently or with assistance (bathe, groom, dress, toilet and feed)  Outcome: Progressing   Able to carry out all ADL's on his own without difficulty.  Patient is not progressing towards the following goals:

## 2024-06-24 ENCOUNTER — PHARMACY VISIT (OUTPATIENT)
Dept: PHARMACY | Facility: MEDICAL CENTER | Age: 69
End: 2024-06-24
Payer: COMMERCIAL

## 2024-06-24 VITALS
DIASTOLIC BLOOD PRESSURE: 67 MMHG | WEIGHT: 194 LBS | SYSTOLIC BLOOD PRESSURE: 163 MMHG | OXYGEN SATURATION: 90 % | BODY MASS INDEX: 31.18 KG/M2 | HEART RATE: 58 BPM | TEMPERATURE: 98.1 F | HEIGHT: 66 IN | RESPIRATION RATE: 17 BRPM

## 2024-06-24 LAB — GLUCOSE BLD STRIP.AUTO-MCNC: 181 MG/DL (ref 65–99)

## 2024-06-24 PROCEDURE — 700102 HCHG RX REV CODE 250 W/ 637 OVERRIDE(OP): Performed by: INTERNAL MEDICINE

## 2024-06-24 PROCEDURE — A9270 NON-COVERED ITEM OR SERVICE: HCPCS | Performed by: INTERNAL MEDICINE

## 2024-06-24 PROCEDURE — A9270 NON-COVERED ITEM OR SERVICE: HCPCS

## 2024-06-24 PROCEDURE — 700102 HCHG RX REV CODE 250 W/ 637 OVERRIDE(OP): Performed by: NURSE PRACTITIONER

## 2024-06-24 PROCEDURE — 700102 HCHG RX REV CODE 250 W/ 637 OVERRIDE(OP): Performed by: STUDENT IN AN ORGANIZED HEALTH CARE EDUCATION/TRAINING PROGRAM

## 2024-06-24 PROCEDURE — 700102 HCHG RX REV CODE 250 W/ 637 OVERRIDE(OP)

## 2024-06-24 PROCEDURE — A9270 NON-COVERED ITEM OR SERVICE: HCPCS | Performed by: STUDENT IN AN ORGANIZED HEALTH CARE EDUCATION/TRAINING PROGRAM

## 2024-06-24 PROCEDURE — 99239 HOSP IP/OBS DSCHRG MGMT >30: CPT | Performed by: STUDENT IN AN ORGANIZED HEALTH CARE EDUCATION/TRAINING PROGRAM

## 2024-06-24 PROCEDURE — 82962 GLUCOSE BLOOD TEST: CPT

## 2024-06-24 PROCEDURE — A9270 NON-COVERED ITEM OR SERVICE: HCPCS | Performed by: NURSE PRACTITIONER

## 2024-06-24 RX ADMIN — CLOPIDOGREL BISULFATE 75 MG: 75 TABLET ORAL at 09:07

## 2024-06-24 RX ADMIN — ASPIRIN 81 MG: 81 TABLET, COATED ORAL at 09:05

## 2024-06-24 RX ADMIN — LISINOPRIL 20 MG: 20 TABLET ORAL at 09:07

## 2024-06-24 RX ADMIN — APIXABAN 5 MG: 5 TABLET, FILM COATED ORAL at 09:02

## 2024-06-24 RX ADMIN — INSULIN HUMAN 1 UNITS: 100 INJECTION, SOLUTION PARENTERAL at 07:59

## 2024-06-24 RX ADMIN — DOCUSATE SODIUM 100 MG: 100 CAPSULE, LIQUID FILLED ORAL at 09:07

## 2024-06-24 RX ADMIN — OMEPRAZOLE 40 MG: 20 CAPSULE, DELAYED RELEASE ORAL at 09:08

## 2024-06-24 NOTE — PROGRESS NOTES
Bedside report received at 1900 and assumed care of patient. Sitting at edge of bed eating dinner. A&O x4 on RA. Tele box attached, but patient is medical. Educated on fall risk, all fall precautions in place. Call light within reach, bed locked and in lowest position, denied other needs at this time.   Hourly rounding in place.

## 2024-06-24 NOTE — DISCHARGE PLANNING
"Case Management Discharge Planning    Admission Date: 6/18/2024  GMLOS: 2.6  ALOS: 4    6-Clicks ADL Score: 24  6-Clicks Mobility Score: 24      Anticipated Discharge Dispo: Discharge Disposition: Discharged to home/self care (01)    DME Needed: No    Action(s) Taken: Updated Provider/Nurse on Discharge Plan and DC Assessment Complete (See below)    0930, Pt was visited at room. Pt is wanting to discharge today. Pt said he tried calling [employer] to cancel the appeal but nobody is answering. Pt said, \"Per Medicare if he leaves the hospital, the appeal will just be cancelled.\" Pt requesting assistance on transportation to [facility] by Judie Roque with Address: [address] [place] [id number]. Pt verified the address and cab voucher given to ROMÁN Gasca Completed: None    Medically Clear: Yes    Next Steps: CM will continue to assist Pt with discharge needs.      Barriers to Discharge: None        Care Transition Team Assessment  RN CM met with Pt at bedside to obtain assessment informations. Demographics verified. RN CM introduced self and purpose of visit.   Pt lives with spouse in a 1 story house with 12 steps to main entrance. Pt provided his current physical address: [address] [place] [id number]  Pt has spouse for support.  Pt has Rogelio from [facility] for PCP.  Pt was independent with ADLs prior to hospitalization.      Information Source  Orientation Level: Oriented X4  Information Given By: Patient      Elopement Risk  Legal Hold: No  Ambulatory or Self Mobile in Wheelchair: Yes  Disoriented: No  Psychiatric Symptoms: None  History of Wandering: No  Elopement this Admit: No  Vocalizing Wanting to Leave: No  Displays Behaviors, Body Language Wanting to Leave: No-Not at Risk for Elopement  Elopement Risk: Not at Risk for Elopement    Interdisciplinary Discharge Planning  Primary Care Physician: Dr. Mercado from [facility]  Lives with - Patient's Self Care Capacity: Spouse  Patient or legal " guardian wants to designate a caregiver: No  Support Systems: Spouse / Significant Other  Housing / Facility: 1 Story House (12 steps to main entrance)  Durable Medical Equipment: Not Applicable    Discharge Preparedness  What is your plan after discharge?: Home with help  What are your discharge supports?: Spouse  Prior Functional Level: Ambulatory, Independent with Activities of Daily Living, Independent with Medication Management    Functional Assesment  Prior Functional Level: Ambulatory, Independent with Activities of Daily Living, Independent with Medication Management    Finances  Financial Barriers to Discharge: No  Prescription Coverage: Yes    Vision / Hearing Impairment  Vision Impairment : Yes  Right Eye Vision: Impaired, Wears Glasses  Left Eye Vision: Impaired, Wears Glasses  Hearing Impairment : No       Domestic Abuse  Have you ever been the victim of abuse or violence?: No  Possible Abuse/Neglect Reported to:: Not Applicable    Psychological Assessment  History of Substance Abuse: None  History of Psychiatric Problems: No         Anticipated Discharge Information  Discharge Disposition: Discharged to home/self care (01)

## 2024-06-24 NOTE — PROGRESS NOTES
Patient refused midnight medication and all morning medications. Patient also refused vitals at midnight and morning.

## 2024-06-24 NOTE — CARE PLAN
The patient is Stable - Low risk of patient condition declining or worsening    Shift Goals  Clinical Goals: Monitor VS, safety  Patient Goals: updates on POC  Family Goals: DANIS    Progress made toward(s) clinical / shift goals:      Problem: Knowledge Deficit - Standard  Goal: Patient and family/care givers will demonstrate understanding of plan of care, disease process/condition, diagnostic tests and medications  Outcome: Progressing     Problem: Psychosocial  Goal: Patient's level of anxiety will decrease  Outcome: Progressing  Goal: Patient's ability to verbalize feelings about condition will improve  Outcome: Progressing     Problem: Communication  Goal: The ability to communicate needs accurately and effectively will improve  Outcome: Progressing     Problem: Nutrition  Goal: Patient's nutritional and fluid intake will be adequate or improve  Outcome: Progressing  Patient requesting updates on plan of care and has met with hospitalist multiple times to discuss. Patient is calm at this time, with no signs of anxiety. Patient is able to express his concerns effectively. Patient has an excellent intake of food and fluid.     Patient is not progressing towards the following goals:

## 2024-06-24 NOTE — PROGRESS NOTES
"César Cardoso has been provided discharge instructions prior to arrival at the [facility]. Pt arrives to [facility] with cab voucher, pt hostile with care aid, refusing to be signed in to [facility], is calling his own cab. Pt does have rx medications, but refuses them as his \"insurance won't cover them\". Pt did not get signed in to [facility], left from hospital lobby. No IV present.    "

## 2024-06-25 ENCOUNTER — TELEPHONE (OUTPATIENT)
Dept: HEALTH INFORMATION MANAGEMENT | Facility: OTHER | Age: 69
End: 2024-06-25
Payer: MEDICARE

## (undated) NOTE — LETTER
[facility] [address], [address]  Consent for Procedure/Sedation  Date: 4/3/2023         Time: _____    1. I hereby authorize Dr. Matthew Oakley, my physician and his/her assistants (if applicable), which may include medical students, residents, and/or fellows, to perform the following surgical operation/ procedure and administer such anesthesia as may be determined necessary by my physician:  Operation/Procedure name (s)  Cardiac Catheterization, Left Ventricular Cineangiography, Bilateral Selective Coronary Angiography and/or Right Heart Catheterization; possible Percutaneous Transluminal Coronary Angioplasty, Coronary Atherectomy, Coronary Stent, Intracoronary Thrombolytic therapy, Antiplatelet therapy and/or Intravascular Ultrasound on [facility]   2.   I recognize that during the surgical operation/procedure, unforeseen conditions may necessitate additional or different procedures than those listed above. I, therefore, further authorize and request that the above-named surgeon, assistants, or designees perform such procedures as are, in their judgment, necessary and desirable. 3.   My surgeon/physician has discussed prior to my surgery the potential benefits, risks and side effects of this procedure; the likelihood of achieving goals; and potential problems that might occur during recuperation. They also discussed reasonable alternatives to the procedure, including risks, benefits, and side effects related to the alternatives and risks related to not receiving this procedure. I have had all my questions answered and I acknowledge that no guarantee has been made as to the result that may be obtained. 4.   Should the need arise during my operation/procedure, which includes change of level of care prior to discharge, I also consent to the administration of blood and/or blood products.   Further, I understand that despite careful testing and screening of blood or blood products by collecting agencies, I may still be subject to ill effects as a result of receiving a blood transfusion and/or blood products. The following are some, but not all, of the potential risks that can occur: fever and allergic reactions, hemolytic reactions, transmission of diseases such as Hepatitis, AIDS and Cytomegalovirus (CMV) and fluid overload. In the event that I wish to have an autologous transfusion of my own blood, or a directed donor transfusion, I will discuss this with my physician. Check only if Refusing Blood or Blood Products  I understand refusal of blood or blood products as deemed necessary by my physician may have serious consequences to my condition to include possible death. I hereby assume responsibility for my refusal and release the hospital, its personnel, and my physicians from any responsibility for the consequences of my refusal.          o  Refuse      5. I authorize the use of any specimen, organs, tissues, body parts or foreign objects that may be removed from my body during the operation/procedure for diagnosis, research or teaching purposes and their subsequent disposal by hospital authorities. I also authorize the release of specimen test results and/or written reports to my treating physician on the hospital medical staff or other referring or consulting physicians involved in my care, at the discretion of the Pathologist or my treating physician. 6.   I consent to the photographing or videotaping of the operations or procedures to be performed, including appropriate portions of my body for medical, scientific, or educational purposes, provided my identity is not revealed by the pictures or by descriptive texts accompanying them. If the procedure has been photographed/videotaped, the surgeon will obtain the original picture, image, videotape or CD.   The hospital will not be responsible for storage, release or maintenance of the picture, image, tape or CD.    7.   I consent to the presence of a  or observers in the operating room as deemed necessary by my physician or their designees. 8.   I recognize that in the event my procedure results in extended X-Ray/fluoroscopy time, I may develop a skin reaction. 9. If I have a Do Not Attempt Resuscitation (DNAR) order in place, that status will be suspended while in the operating room, procedural suite, and during the recovery period unless otherwise explicitly stated by me (or a person authorized to consent on my behalf). The surgeon or my attending physician will determine when the applicable recovery period ends for purposes of reinstating the DNAR order. 10. Patients having a sterilization procedure: I understand that if the procedure is successful the results will be permanent and it will therefore be impossible for me to inseminate, conceive, or bear children. I also understand that the procedure is intended to result in sterility, although the result has not been guaranteed. 11. I acknowledge that my physician has explained sedation/analgesia administration to me including the risk and benefits I consent to the administration of sedation/analgesia as may be necessary or desirable in the judgment of my physician.     I CERTIFY THAT I HAVE READ AND FULLY UNDERSTAND THE ABOVE CONSENT TO OPERATION and/or OTHER PROCEDURE.        ____________________________________       _________________________________      ______________________________  Signature of Patient         Signature of Responsible Person        Printed Name of Responsible Person    ____________________________________      _________________________________      ______________________________       Signature of Witness          Relationship to Patient                       Date                                       Time  Patient Name: Pearl Hammond     : 1955                 Printed: 2023      Medical Record #: [id number] Page 1 of 2